# Patient Record
Sex: MALE | Race: WHITE | Employment: OTHER | URBAN - METROPOLITAN AREA
[De-identification: names, ages, dates, MRNs, and addresses within clinical notes are randomized per-mention and may not be internally consistent; named-entity substitution may affect disease eponyms.]

---

## 2017-05-09 ENCOUNTER — LAB ENCOUNTER (OUTPATIENT)
Dept: LAB | Facility: HOSPITAL | Age: 67
End: 2017-05-09
Attending: INTERNAL MEDICINE
Payer: COMMERCIAL

## 2017-05-09 ENCOUNTER — PRIOR ORIGINAL RECORDS (OUTPATIENT)
Dept: OTHER | Age: 67
End: 2017-05-09

## 2017-05-09 DIAGNOSIS — I10 HTN (HYPERTENSION): ICD-10-CM

## 2017-05-09 DIAGNOSIS — E78.2 MIXED HYPERLIPIDEMIA: Primary | ICD-10-CM

## 2017-05-09 DIAGNOSIS — Z12.5 SPECIAL SCREENING FOR MALIGNANT NEOPLASM OF PROSTATE: ICD-10-CM

## 2017-05-09 PROCEDURE — 85025 COMPLETE CBC W/AUTO DIFF WBC: CPT

## 2017-05-09 PROCEDURE — 80061 LIPID PANEL: CPT

## 2017-05-09 PROCEDURE — 81001 URINALYSIS AUTO W/SCOPE: CPT

## 2017-05-09 PROCEDURE — 84153 ASSAY OF PSA TOTAL: CPT

## 2017-05-09 PROCEDURE — 80053 COMPREHEN METABOLIC PANEL: CPT

## 2017-05-09 PROCEDURE — 36415 COLL VENOUS BLD VENIPUNCTURE: CPT

## 2017-12-19 ENCOUNTER — PRIOR ORIGINAL RECORDS (OUTPATIENT)
Dept: OTHER | Age: 67
End: 2017-12-19

## 2017-12-22 ENCOUNTER — PRIOR ORIGINAL RECORDS (OUTPATIENT)
Dept: OTHER | Age: 67
End: 2017-12-22

## 2017-12-22 ENCOUNTER — MYAURORA ACCOUNT LINK (OUTPATIENT)
Dept: OTHER | Age: 67
End: 2017-12-22

## 2018-01-29 ENCOUNTER — PRIOR ORIGINAL RECORDS (OUTPATIENT)
Dept: OTHER | Age: 68
End: 2018-01-29

## 2018-02-02 LAB
ALBUMIN: 4 G/DL
ALKALINE PHOSPHATATE(ALK PHOS): 46 IU/L
ALT (SGPT): 20 U/L
AST (SGOT): 19 U/L
BILIRUBIN TOTAL: 0.4 MG/DL
BUN: 10 MG/DL
CALCIUM: 8.8 MG/DL
CHLORIDE: 102 MEQ/L
CHOLESTEROL, TOTAL: 153 MG/DL
CREATININE, SERUM: 0.83 MG/DL
GLOBULIN: 3 G/DL
GLUCOSE: 107 MG/DL
GLUCOSE: 107 MG/DL
HDL CHOLESTEROL: 34 MG/DL
HEMATOCRIT: 43.9 %
HEMOGLOBIN: 14.5 G/DL
LDL CHOLESTEROL: 69 MG/DL
NON-HDL CHOLESTEROL: 119 MG/DL
PLATELETS: 131 K/UL
POTASSIUM, SERUM: 4 MEQ/L
PROTEIN, TOTAL: 7 G/DL
RED BLOOD COUNT: 5.36 X 10-6/U
SGOT (AST): 19 IU/L
SGPT (ALT): 20 IU/L
SODIUM: 137 MEQ/L
TRIGLYCERIDES: 250 MG/DL
WHITE BLOOD COUNT: 7.1 X 10-3/U

## 2018-02-05 ENCOUNTER — MYAURORA ACCOUNT LINK (OUTPATIENT)
Dept: OTHER | Age: 68
End: 2018-02-05

## 2018-02-05 ENCOUNTER — PRIOR ORIGINAL RECORDS (OUTPATIENT)
Dept: OTHER | Age: 68
End: 2018-02-05

## 2018-02-23 ENCOUNTER — APPOINTMENT (OUTPATIENT)
Dept: CV DIAGNOSTICS | Facility: HOSPITAL | Age: 68
DRG: 853 | End: 2018-02-23
Attending: INTERNAL MEDICINE
Payer: MEDICARE

## 2018-02-23 ENCOUNTER — APPOINTMENT (OUTPATIENT)
Dept: GENERAL RADIOLOGY | Facility: HOSPITAL | Age: 68
DRG: 853 | End: 2018-02-23
Attending: INTERNAL MEDICINE
Payer: MEDICARE

## 2018-02-23 ENCOUNTER — HOSPITAL ENCOUNTER (INPATIENT)
Facility: HOSPITAL | Age: 68
LOS: 7 days | Discharge: HOME | DRG: 853 | End: 2018-03-02
Attending: INTERNAL MEDICINE | Admitting: INTERNAL MEDICINE
Payer: MEDICARE

## 2018-02-23 DIAGNOSIS — A41.9 SEPSIS (HCC): ICD-10-CM

## 2018-02-23 LAB
ALBUMIN SERPL BCP-MCNC: 2.9 G/DL (ref 3.5–4.8)
ALBUMIN/GLOB SERPL: 0.9 {RATIO} (ref 1–2)
ALP SERPL-CCNC: 60 U/L (ref 32–100)
ALT SERPL-CCNC: 42 U/L (ref 17–63)
ANION GAP SERPL CALC-SCNC: 12 MMOL/L (ref 0–18)
AST SERPL-CCNC: 43 U/L (ref 15–41)
BACTERIA UR QL AUTO: NEGATIVE /HPF
BASOPHILS # BLD: 0.1 K/UL (ref 0–0.2)
BASOPHILS NFR BLD: 1 %
BILIRUB SERPL-MCNC: 0.9 MG/DL (ref 0.3–1.2)
BILIRUB UR QL: NEGATIVE
BUN SERPL-MCNC: 18 MG/DL (ref 8–20)
BUN/CREAT SERPL: 20.2 (ref 10–20)
CALCIUM SERPL-MCNC: 8 MG/DL (ref 8.5–10.5)
CHLORIDE SERPL-SCNC: 92 MMOL/L (ref 95–110)
CLARITY UR: CLEAR
CO2 SERPL-SCNC: 25 MMOL/L (ref 22–32)
COLOR UR: YELLOW
CREAT SERPL-MCNC: 0.89 MG/DL (ref 0.5–1.5)
EOSINOPHIL # BLD: 0 K/UL (ref 0–0.7)
EOSINOPHIL NFR BLD: 1 %
ERYTHROCYTE [DISTWIDTH] IN BLOOD BY AUTOMATED COUNT: 14.5 % (ref 11–15)
GLOBULIN PLAS-MCNC: 3.4 G/DL (ref 2.5–3.7)
GLUCOSE SERPL-MCNC: 127 MG/DL (ref 70–99)
GLUCOSE UR-MCNC: NEGATIVE MG/DL
HCT VFR BLD AUTO: 34.3 % (ref 41–52)
HGB BLD-MCNC: 11.3 G/DL (ref 13.5–17.5)
KETONES UR-MCNC: NEGATIVE MG/DL
LEUKOCYTE ESTERASE UR QL STRIP.AUTO: NEGATIVE
LYMPHOCYTES # BLD: 0.4 K/UL (ref 1–4)
LYMPHOCYTES NFR BLD: 4 %
MCH RBC QN AUTO: 26.4 PG (ref 27–32)
MCHC RBC AUTO-ENTMCNC: 32.9 G/DL (ref 32–37)
MCV RBC AUTO: 80.1 FL (ref 80–100)
MONOCYTES # BLD: 0.7 K/UL (ref 0–1)
MONOCYTES NFR BLD: 6 %
NEUTROPHILS # BLD AUTO: 9.2 K/UL (ref 1.8–7.7)
NEUTROPHILS NFR BLD: 88 %
NITRITE UR QL STRIP.AUTO: NEGATIVE
OSMOLALITY UR CALC.SUM OF ELEC: 271 MOSM/KG (ref 275–295)
PH UR: 6 [PH] (ref 5–8)
PLATELET # BLD AUTO: 96 K/UL (ref 140–400)
PMV BLD AUTO: 9.8 FL (ref 7.4–10.3)
POTASSIUM SERPL-SCNC: 3.9 MMOL/L (ref 3.3–5.1)
PROT SERPL-MCNC: 6.3 G/DL (ref 5.9–8.4)
PROT UR-MCNC: 30 MG/DL
RBC # BLD AUTO: 4.29 M/UL (ref 4.5–5.9)
RBC #/AREA URNS AUTO: 1 /HPF
SODIUM SERPL-SCNC: 129 MMOL/L (ref 136–144)
SP GR UR STRIP: 1.01 (ref 1–1.03)
UROBILINOGEN UR STRIP-ACNC: 4
VIT C UR-MCNC: NEGATIVE MG/DL
WBC # BLD AUTO: 10.4 K/UL (ref 4–11)
WBC #/AREA URNS AUTO: 2 /HPF

## 2018-02-23 PROCEDURE — 93005 ELECTROCARDIOGRAM TRACING: CPT

## 2018-02-23 PROCEDURE — 81001 URINALYSIS AUTO W/SCOPE: CPT | Performed by: INTERNAL MEDICINE

## 2018-02-23 PROCEDURE — A4216 STERILE WATER/SALINE, 10 ML: HCPCS

## 2018-02-23 PROCEDURE — 85025 COMPLETE CBC W/AUTO DIFF WBC: CPT | Performed by: INTERNAL MEDICINE

## 2018-02-23 PROCEDURE — 96365 THER/PROPH/DIAG IV INF INIT: CPT

## 2018-02-23 PROCEDURE — 87147 CULTURE TYPE IMMUNOLOGIC: CPT | Performed by: INTERNAL MEDICINE

## 2018-02-23 PROCEDURE — 87186 SC STD MICRODIL/AGAR DIL: CPT | Performed by: INTERNAL MEDICINE

## 2018-02-23 PROCEDURE — 87040 BLOOD CULTURE FOR BACTERIA: CPT | Performed by: INTERNAL MEDICINE

## 2018-02-23 PROCEDURE — 93306 TTE W/DOPPLER COMPLETE: CPT | Performed by: INTERNAL MEDICINE

## 2018-02-23 PROCEDURE — 93010 ELECTROCARDIOGRAM REPORT: CPT | Performed by: INTERNAL MEDICINE

## 2018-02-23 PROCEDURE — 73502 X-RAY EXAM HIP UNI 2-3 VIEWS: CPT | Performed by: INTERNAL MEDICINE

## 2018-02-23 PROCEDURE — 80053 COMPREHEN METABOLIC PANEL: CPT | Performed by: INTERNAL MEDICINE

## 2018-02-23 RX ORDER — 0.9 % SODIUM CHLORIDE 0.9 %
VIAL (ML) INJECTION
Status: DISPENSED
Start: 2018-02-23 | End: 2018-02-24

## 2018-02-23 RX ORDER — COVID-19 ANTIGEN TEST
220 KIT MISCELLANEOUS 2 TIMES DAILY PRN
Status: ON HOLD | COMMUNITY
End: 2018-03-21

## 2018-02-23 RX ORDER — CYCLOBENZAPRINE HCL 10 MG
10 TABLET ORAL NIGHTLY PRN
Status: DISCONTINUED | OUTPATIENT
Start: 2018-02-23 | End: 2018-03-02

## 2018-02-23 RX ORDER — ATORVASTATIN CALCIUM 20 MG/1
20 TABLET, FILM COATED ORAL NIGHTLY
Status: ON HOLD | COMMUNITY
End: 2018-03-21

## 2018-02-23 RX ORDER — ACETAMINOPHEN 325 MG/1
650 TABLET ORAL EVERY 6 HOURS PRN
Status: DISCONTINUED | OUTPATIENT
Start: 2018-02-23 | End: 2018-03-02

## 2018-02-23 RX ORDER — LISINOPRIL 20 MG/1
20 TABLET ORAL DAILY
Status: DISCONTINUED | OUTPATIENT
Start: 2018-02-23 | End: 2018-03-02

## 2018-02-23 RX ORDER — ATORVASTATIN CALCIUM 20 MG/1
20 TABLET, FILM COATED ORAL NIGHTLY
Status: DISCONTINUED | OUTPATIENT
Start: 2018-02-23 | End: 2018-03-02

## 2018-02-23 RX ORDER — HYDROCODONE BITARTRATE AND ACETAMINOPHEN 5; 325 MG/1; MG/1
1-2 TABLET ORAL
Status: DISCONTINUED | OUTPATIENT
Start: 2018-02-23 | End: 2018-02-27

## 2018-02-23 RX ORDER — ASPIRIN 81 MG/1
81 TABLET ORAL DAILY
Status: DISCONTINUED | OUTPATIENT
Start: 2018-02-23 | End: 2018-02-28

## 2018-02-23 RX ORDER — LISINOPRIL 20 MG/1
10 TABLET ORAL DAILY
COMMUNITY

## 2018-02-23 RX ORDER — CYCLOBENZAPRINE HCL 10 MG
10 TABLET ORAL NIGHTLY PRN
COMMUNITY

## 2018-02-23 RX ORDER — HYDROCODONE BITARTRATE AND ACETAMINOPHEN 5; 325 MG/1; MG/1
1-2 TABLET ORAL
COMMUNITY

## 2018-02-23 NOTE — CONSULTS
INFECTIOUS DISEASE CONSULT NOTE    Carlos Orlando Patient Status:  Inpatient    1950 MRN [id number]   Location [facility] 1W Attending Darrius Yoder, *   Hosp Day # 0 PCP per tab 1-2 tablet, 1-2 tablet, Oral, Daily PRN  •  lisinopril (PRINIVIL,ZESTRIL) tab 20 mg, 20 mg, Oral, Daily  •  Sodium Chloride 0.9 % solution, , ,   •  Vancomycin HCl (VANCOCIN) 2,000 mg in sodium chloride 0.9 % 500 mL IVPB, 25 mg/kg (Adjusted), Intra ALT, BILT, TP in the last 72 hours. Microbiology    Reviewed in EMR    Radiology: Reviewed      Antibiotics: IV vancomycin      ASSESSMENT:  77-year-old male with a history of HTN, HLD who presents to hospital for bacteremia as an outpatient.   Patient w

## 2018-02-23 NOTE — PROGRESS NOTES
02/23/18 1519   Clinical Encounter Type   Visited With Patient   Routine Visit Introduction  ([facility])   Continue Visiting Yes   Patient Spiritual Encounters   Spiritual Assessment Completed 2     Introduced pt to spiritual care and provi

## 2018-02-24 ENCOUNTER — APPOINTMENT (OUTPATIENT)
Dept: GENERAL RADIOLOGY | Facility: HOSPITAL | Age: 68
DRG: 853 | End: 2018-02-24
Attending: INTERNAL MEDICINE
Payer: MEDICARE

## 2018-02-24 PROCEDURE — 72072 X-RAY EXAM THORAC SPINE 3VWS: CPT | Performed by: INTERNAL MEDICINE

## 2018-02-24 PROCEDURE — 72100 X-RAY EXAM L-S SPINE 2/3 VWS: CPT | Performed by: INTERNAL MEDICINE

## 2018-02-24 PROCEDURE — A4216 STERILE WATER/SALINE, 10 ML: HCPCS

## 2018-02-24 RX ORDER — 0.9 % SODIUM CHLORIDE 0.9 %
VIAL (ML) INJECTION
Status: DISPENSED
Start: 2018-02-24 | End: 2018-02-25

## 2018-02-24 NOTE — PLAN OF CARE
Dr Orquidea Ceballos made aware of positive blood culture results. States to keep with current Vancomycin antibiotic. No new orders.

## 2018-02-24 NOTE — HISTORICAL OFFICE NOTE
Tadeo Holden  : 1950  ACCOUNT:  [id number]  [phone]  PCP: Dr. Telles Resides     TODAY'S DATE: 2018  DICTATED BY:  [Dr. Danitza Kenny]      CHIEF COMPLAINT: [Followup of Hypercholesteremia, pure and Followup of Hypertension.] and rhythm, clear to auscultation. GI: no masses, tenderness or hepatosplenomegaly, rectal deferred. MS: adequate gait for exercise/testing. EXT: no clubbing or cyanosis. SKIN: no rashes, lesions, ulcers.   NEURO/PSYCH: alert and oriented to time, place an

## 2018-02-24 NOTE — CONSULTS
[facility] AND [facility]  [facility] Cardiology Consult Note    Irena Morrissey Patient Status:  Inpatient    1950 MRN [id number]   [facility] 5SW/SE Attending Lesley Blanco, *   Hosp Day # 1 PCP Oumou Rodriguez MD     61 Alert and oriented x 3. No apparent distress. No respiratory or constitutional distress. HEENT: Normocephalic, anicteric sclera, neck supple. Neck: No JVD, carotids 2+, no bruits. Cardiac: Regular rate and rhythm.  S1, S2 normal. No murmur, pericardial r

## 2018-02-24 NOTE — PROGRESS NOTES
[facility] [place] [facility]    Progress Note    Aleida Adhikari Patient Status:  Inpatient    1950 MRN [id number]   [facility] 5SW/SE Attending Herminia Michael, [address] Day # 1 PCP Maria Isabel Cody MD     Subject (L) 02/23/2018   ALB 2.9 (L) 02/23/2018   ALKPHO 60 02/23/2018   BILT 0.9 02/23/2018   TP 6.3 02/23/2018   AST 43 (H) 02/23/2018   ALT 42 02/23/2018   PSA 1.4 05/09/2017       Xr Routine Thoracic Spine (3 Views) (cpt=72072)    Result Date: 2/24/2018  CONCL

## 2018-02-24 NOTE — PROGRESS NOTES
[facility] ID PROGRESS NOTE    [facility] Patient Status:  Inpatient    1950 MRN [id number]   Location [facility] 5SW/SE Attending Sky Yi Day # 1 PCP Maribel Murphy MD     Subjective:  Awake, feeling bet he scratches.     # Acute GPC in pairs bacteremia with rigors - r/o IE; ?  Skin source; r/o septic joint focus although not consistent on exam    -XR hip/pelvis with no changes, FU hip of thoracic and lumbar spine  # HTN  # HLD      PLAN:  -  Continue with

## 2018-02-24 NOTE — CM/SW NOTE
MDO received indicating pt needs information on Medicare B. SW obtained paperwork for the Medicare eligibility and the enrollment process. This was provided to the nurse to provide to family when they arrive.     ANA MARÍA quigley [id number]

## 2018-02-24 NOTE — H&P
[facility]    PATIENT'S NAME: Ole Lees   ATTENDING PHYSICIAN: Rd Hook MD   PATIENT ACCOUNT#:   [id number]    LOCATION:  [place] [address]. RECORD #:   [id number]       YOB: 1950  ADMISSION DATE: admitted for bacteremia. He has gram-positive cocci in pairs. We have ID on the case. We will put him on vancomycin, Pharmacy to dose. Will order x-ray of the pelvis and echocardiogram tomorrow.     Dictated By Timmy Alva MD  d: 02/23/2018 1

## 2018-02-24 NOTE — PLAN OF CARE
Problem: Patient/Family Goals  Goal: Patient/Family Long Term Goal  Patient's Long Term Goal: to go home  Interventions:  - See additional Care Plan goals for specific interventions    Outcome: Progressing    Goal: Patient/Family Short Term Goal  Patient's

## 2018-02-24 NOTE — PLAN OF CARE
Problem: Patient/Family Goals  Goal: Patient/Family Long Term Goal  Patient's Long Term Goal: to go home  Interventions:  - See additional Care Plan goals for specific interventions    Outcome: Not Progressing  Patient just admitted today  Goal: Patient/Fa

## 2018-02-25 PROBLEM — A41.89 SEPSIS, GRAM POSITIVE (HCC): Status: ACTIVE | Noted: 2018-02-25

## 2018-02-25 LAB — VANCOMYCIN TROUGH SERPL-MCNC: 7.6 MCG/ML (ref 10–20)

## 2018-02-25 PROCEDURE — 97162 PT EVAL MOD COMPLEX 30 MIN: CPT

## 2018-02-25 PROCEDURE — 97116 GAIT TRAINING THERAPY: CPT

## 2018-02-25 PROCEDURE — A4216 STERILE WATER/SALINE, 10 ML: HCPCS | Performed by: NURSE PRACTITIONER

## 2018-02-25 PROCEDURE — 97166 OT EVAL MOD COMPLEX 45 MIN: CPT

## 2018-02-25 PROCEDURE — 80202 ASSAY OF VANCOMYCIN: CPT | Performed by: INTERNAL MEDICINE

## 2018-02-25 PROCEDURE — 87040 BLOOD CULTURE FOR BACTERIA: CPT | Performed by: PHYSICIAN ASSISTANT

## 2018-02-25 RX ORDER — SODIUM CHLORIDE 0.9 % (FLUSH) 0.9 %
10 SYRINGE (ML) INJECTION AS NEEDED
Status: DISCONTINUED | OUTPATIENT
Start: 2018-02-25 | End: 2018-03-01

## 2018-02-25 RX ORDER — SODIUM CHLORIDE 9 MG/ML
INJECTION, SOLUTION INTRAVENOUS CONTINUOUS
Status: DISCONTINUED | OUTPATIENT
Start: 2018-02-25 | End: 2018-03-02

## 2018-02-25 NOTE — PROGRESS NOTES
[facility]    Progress Note    Nelda Bob Patient Status:  Inpatient    1950 MRN [id number]   [facility] 5SW/SE Attending Frankie Santos, [facility] Day # 2 PCP Nu Maharaj MD     Subject 02/23/2018   ALB 2.9 (L) 02/23/2018   ALKPHO 60 02/23/2018   BILT 0.9 02/23/2018   TP 6.3 02/23/2018   AST 43 (H) 02/23/2018   ALT 42 02/23/2018   PSA 1.4 05/09/2017       Xr Routine Thoracic Spine (3 Views) (cpt=72072)    Result Date: 2/24/2018  CONCLUSIO

## 2018-02-25 NOTE — OCCUPATIONAL THERAPY NOTE
OCCUPATIONAL THERAPY EVALUATION - INPATIENT     Room Number: 525/525-A  Evaluation Date: 2/25/2018  Type of Evaluation: Initial  Presenting Problem: sepsis    Physician Order: IP Consult to Occupational Therapy  Reason for Therapy: ADL/IADL Dysfunction and prior level of function. 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  He will benefit from con't acute OT for family training to train wife how to assist pt with safe bed mobility transf lower leg  Management Techniques: Relaxation;Repositioning    ACTIVITY TOLERANCE  O2 Saturation: 92%  Room air  Heart Rate: 96    COGNITION  Following Commands:  follows one step commands without difficulty        PERCEPTION  Overall Perception Status:   W sponge for LB bathing  Toileting: NT  Upper Extremity Dressing: independent  Lower Extremity Dressing: Max A to don socks. Pt will benefit from instruction for [facility] however wife is currently assisting him.     Education Provided: role of OT, functional platt

## 2018-02-25 NOTE — PROGRESS NOTES
[facility] ID PROGRESS NOTE    Elsa Birmingham Patient Status:  Inpatient    1950 MRN [id number]   Location [facility] 5SW/SE Attending Lucinda Mauro Day # 2 PCP Yaz Becerril MD     Subjective:  Awake, feeling bet does get superificial burns at work which he scratches.     # Acute Staph aureus bacteremia, sepsis - r/o IE; ?  Skin source; r/o septic joint focus although not consistent on exam    -XR hip/pelvis with no changes, FU hip of thoracic and lumbar spine   -no

## 2018-02-25 NOTE — PLAN OF CARE
Problem: Patient/Family Goals  Goal: Patient/Family Long Term Goal  Patient's Long Term Goal: to go home  Interventions: BLOOD CX  -2D ECHO  - See additional Care Plan goals for specific interventions    Outcome: Progressing    Goal: Patient/Family Short T

## 2018-02-25 NOTE — PHYSICAL THERAPY NOTE
PHYSICAL THERAPY EVALUATION - INPATIENT     Room Number: 525/525-A  Evaluation Date: 2/25/2018  Type of Evaluation: Initial  Physician Order: PT Eval and Treat    Presenting Problem: sepsis, abnormal EKG, pain  Reason for Therapy: Mobility Dysfunctio bpm    AM-PAC '6-Clicks' INPATIENT SHORT FORM - BASIC MOBILITY  How much difficulty does the patient currently have. ..  -   Turning over in bed (including adjusting bedclothes, sheets and blankets)?: None   -   Sitting down on and standing up from a chair the evaluation complexity is considered moderate. These impairments and comorbidities manifest themselves as functional limitations in independent bed mobility, transfers, and gait.   Currently he required SBA with bed mobility with VCs for proper techniqu

## 2018-02-25 NOTE — PROGRESS NOTES
[facility]    Progress Note    Romeo Bridegroom Patient Status:  Inpatient    1950 MRN [id number]   [facility] 5SW/SE Attending Jyothi Chowdhury, [address] Day # 2 PCP Inge Ibanez MD       Assessm 1440   RBC  4.29*   HGB  11.3*   HCT  34.3*   MCV  80.1   MCH  26.4*   MCHC  32.9   RDW  14.5   WBC  10.4   PLT  96*       Recent Labs   Lab  02/23/18   1440   GLU  127*   BUN  18   CREATSERUM  0.89   GFRAA  >60   GFRNAA  >60   CA  8.0*   NA  129*   K  3.9

## 2018-02-26 ENCOUNTER — APPOINTMENT (OUTPATIENT)
Dept: CV DIAGNOSTICS | Facility: HOSPITAL | Age: 68
DRG: 853 | End: 2018-02-26
Attending: NURSE PRACTITIONER
Payer: MEDICARE

## 2018-02-26 ENCOUNTER — APPOINTMENT (OUTPATIENT)
Dept: INTERVENTIONAL RADIOLOGY/VASCULAR | Facility: HOSPITAL | Age: 68
DRG: 853 | End: 2018-02-26
Attending: NURSE PRACTITIONER
Payer: MEDICARE

## 2018-02-26 LAB — VANCOMYCIN TROUGH SERPL-MCNC: 10.4 MCG/ML (ref 10–20)

## 2018-02-26 PROCEDURE — B246ZZ4 ULTRASONOGRAPHY OF RIGHT AND LEFT HEART, TRANSESOPHAGEAL: ICD-10-PCS | Performed by: INTERNAL MEDICINE

## 2018-02-26 PROCEDURE — 99153 MOD SED SAME PHYS/QHP EA: CPT

## 2018-02-26 PROCEDURE — 93325 DOPPLER ECHO COLOR FLOW MAPG: CPT | Performed by: NURSE PRACTITIONER

## 2018-02-26 PROCEDURE — 93320 DOPPLER ECHO COMPLETE: CPT | Performed by: NURSE PRACTITIONER

## 2018-02-26 PROCEDURE — 80202 ASSAY OF VANCOMYCIN: CPT | Performed by: INTERNAL MEDICINE

## 2018-02-26 PROCEDURE — A4216 STERILE WATER/SALINE, 10 ML: HCPCS | Performed by: NURSE PRACTITIONER

## 2018-02-26 PROCEDURE — 99152 MOD SED SAME PHYS/QHP 5/>YRS: CPT

## 2018-02-26 PROCEDURE — 93312 ECHO TRANSESOPHAGEAL: CPT

## 2018-02-26 PROCEDURE — 97530 THERAPEUTIC ACTIVITIES: CPT

## 2018-02-26 RX ORDER — MIDAZOLAM HYDROCHLORIDE 1 MG/ML
INJECTION INTRAMUSCULAR; INTRAVENOUS
Status: COMPLETED
Start: 2018-02-26 | End: 2018-02-26

## 2018-02-26 RX ORDER — MIDAZOLAM HYDROCHLORIDE 1 MG/ML
2 INJECTION INTRAMUSCULAR; INTRAVENOUS EVERY 5 MIN PRN
Status: DISCONTINUED | OUTPATIENT
Start: 2018-02-26 | End: 2018-03-02

## 2018-02-26 RX ORDER — CEFAZOLIN SODIUM/WATER 2 G/20 ML
2 SYRINGE (ML) INTRAVENOUS EVERY 8 HOURS
Status: DISCONTINUED | OUTPATIENT
Start: 2018-02-26 | End: 2018-03-02

## 2018-02-26 RX ORDER — SODIUM CHLORIDE 9 MG/ML
INJECTION, SOLUTION INTRAVENOUS
Status: COMPLETED
Start: 2018-02-26 | End: 2018-02-26

## 2018-02-26 RX ORDER — ONDANSETRON 2 MG/ML
4 INJECTION INTRAMUSCULAR; INTRAVENOUS EVERY 6 HOURS PRN
Status: DISCONTINUED | OUTPATIENT
Start: 2018-02-26 | End: 2018-03-02

## 2018-02-26 NOTE — PROGRESS NOTES
[facility] dosing service    Follow-up Pharmacokinetic Consult for Vancomycin Dosing     Henry Soriano is a 69year old male who is being treated for S. Aureus bacteremia.    Patient is on day 3 of Vancomycin currently receiving 1250mg every 12 desean

## 2018-02-26 NOTE — PROGRESS NOTES
[facility]    Progress Note    Roberto Carlos [place] Patient Status:  Inpatient    1950 MRN [id number]   [facility] 5SW/SE Attending Zack Powell, *   Hosp Day # 3 PCP Oumou Florence MD     Subject

## 2018-02-26 NOTE — BRIEF PROCEDURE NOTE
[facility] CARDIOLOGY  WALDEMAR Note    Abhi Dhaliwal Location:      N [id number]   Admission Date 2/23/2018 Operation Date 2/26/2018   Attending Physician Irma Foster, * Operating Physician Bettina Castellano MD     Pre-Operative Diagnosis: Diane Weston

## 2018-02-26 NOTE — PLAN OF CARE
Problem: Patient/Family Goals  Goal: Patient/Family Long Term Goal  Patient's Long Term Goal: to go home  Interventions: BLOOD CX  -2D ECHO  - See additional Care Plan goals for specific interventions     Outcome: Progressing  Pt had WALDEMAR, no endocarditis f

## 2018-02-26 NOTE — PLAN OF CARE
Problem: Patient/Family Goals  Goal: Patient/Family Long Term Goal  Patient's Long Term Goal: to go home  Interventions: BLOOD CX  -2D ECHO  - See additional Care Plan goals for specific interventions     Outcome: Progressing    Goal: Patient/Family Short

## 2018-02-26 NOTE — OCCUPATIONAL THERAPY NOTE
OCCUPATIONAL THERAPY TREATMENT NOTE - INPATIENT        Room Number: 525/525-A           Presenting Problem: sepsis    Problem List  Active Problems:    Sepsis, Gram positive ([facility] [address]      ASSESSMENT   Coordinated care with RN who provided consent to proceed w it bad to stay here? \" re: bed    OBJECTIVE  Precautions: Limb alert - left    WEIGHT BEARING RESTRICTION  Weight Bearing Restriction: None                PAIN ASSESSMENT  Ratin  Location: lower back; LE  Management Techniques: Activity promotion; Body adls with supervision    Comment: CGA ~8 minutes with functional moblity    Patient will complete toileting with supervision  Comment:NT          Goals  on: 3/11/18  Frequency: 3-5x/week      Alva Billings OTR/L   [address] [place] [address]

## 2018-02-26 NOTE — PROGRESS NOTES
Mary Kay Rm  [id number]      Post procedure/ recovery hand-off report given to [facility]. Patient's vital signs stable. Pt able to swallow with out difficulty.   Tania Diggs RN

## 2018-02-26 NOTE — PROGRESS NOTES
[facility]    Progress Note    Jaqui Drafts Patient Status:  Inpatient    1950 MRN [id number]   [facility] 5SW/SE Attending Mau Guevara, *   Hosp Day # 3 PCP Adriana Marie MD     Subject 02/23/2018   CL 92 (L) 02/23/2018   CO2 25 02/23/2018    (H) 02/23/2018   CA 8.0 (L) 02/23/2018   ALB 2.9 (L) 02/23/2018   ALKPHO 60 02/23/2018   BILT 0.9 02/23/2018   TP 6.3 02/23/2018   AST 43 (H) 02/23/2018   ALT 42 02/23/2018   PSA 1.4 05/09/201

## 2018-02-26 NOTE — CM/SW NOTE
Met with patient at bedside to explain the BPCI/Medicare program. Patient agreed with phone follow up for 3 months from [address] [place] [address] after discharge from [address] [place] [address]. Patient was enrolled under DRG   872   . BPCI/Medicare letter and brochure provided.

## 2018-02-27 ENCOUNTER — APPOINTMENT (OUTPATIENT)
Dept: MRI IMAGING | Facility: HOSPITAL | Age: 68
DRG: 853 | End: 2018-02-27
Attending: INTERNAL MEDICINE
Payer: MEDICARE

## 2018-02-27 ENCOUNTER — ANESTHESIA (OUTPATIENT)
Dept: SURGERY | Facility: HOSPITAL | Age: 68
DRG: 853 | End: 2018-02-27
Payer: MEDICARE

## 2018-02-27 ENCOUNTER — ANESTHESIA EVENT (OUTPATIENT)
Dept: SURGERY | Facility: HOSPITAL | Age: 68
DRG: 853 | End: 2018-02-27
Payer: MEDICARE

## 2018-02-27 ENCOUNTER — APPOINTMENT (OUTPATIENT)
Dept: GENERAL RADIOLOGY | Facility: HOSPITAL | Age: 68
DRG: 853 | End: 2018-02-27
Attending: NEUROLOGICAL SURGERY
Payer: MEDICARE

## 2018-02-27 LAB
ANTIBODY SCREEN: NEGATIVE
RH BLOOD TYPE: POSITIVE

## 2018-02-27 PROCEDURE — 86901 BLOOD TYPING SEROLOGIC RH(D): CPT | Performed by: NEUROLOGICAL SURGERY

## 2018-02-27 PROCEDURE — 87205 SMEAR GRAM STAIN: CPT | Performed by: NEUROLOGICAL SURGERY

## 2018-02-27 PROCEDURE — 86850 RBC ANTIBODY SCREEN: CPT | Performed by: NEUROLOGICAL SURGERY

## 2018-02-27 PROCEDURE — 76001 XR C-ARM FLUORO >1 HOUR  (CPT=76001): CPT | Performed by: NEUROLOGICAL SURGERY

## 2018-02-27 PROCEDURE — 87176 TISSUE HOMOGENIZATION CULTR: CPT | Performed by: NEUROLOGICAL SURGERY

## 2018-02-27 PROCEDURE — 87147 CULTURE TYPE IMMUNOLOGIC: CPT | Performed by: NEUROLOGICAL SURGERY

## 2018-02-27 PROCEDURE — 87206 SMEAR FLUORESCENT/ACID STAI: CPT | Performed by: NEUROLOGICAL SURGERY

## 2018-02-27 PROCEDURE — 87075 CULTR BACTERIA EXCEPT BLOOD: CPT | Performed by: NEUROLOGICAL SURGERY

## 2018-02-27 PROCEDURE — 87070 CULTURE OTHR SPECIMN AEROBIC: CPT | Performed by: NEUROLOGICAL SURGERY

## 2018-02-27 PROCEDURE — A4216 STERILE WATER/SALINE, 10 ML: HCPCS | Performed by: NURSE PRACTITIONER

## 2018-02-27 PROCEDURE — 87186 SC STD MICRODIL/AGAR DIL: CPT | Performed by: NEUROLOGICAL SURGERY

## 2018-02-27 PROCEDURE — 87116 MYCOBACTERIA CULTURE: CPT | Performed by: NEUROLOGICAL SURGERY

## 2018-02-27 PROCEDURE — 009U0ZX DRAINAGE OF SPINAL CANAL, OPEN APPROACH, DIAGNOSTIC: ICD-10-PCS | Performed by: NEUROLOGICAL SURGERY

## 2018-02-27 PROCEDURE — 72195 MRI PELVIS W/O DYE: CPT | Performed by: INTERNAL MEDICINE

## 2018-02-27 PROCEDURE — 30233R1 TRANSFUSION OF NONAUTOLOGOUS PLATELETS INTO PERIPHERAL VEIN, PERCUTANEOUS APPROACH: ICD-10-PCS | Performed by: INTERNAL MEDICINE

## 2018-02-27 PROCEDURE — 72158 MRI LUMBAR SPINE W/O & W/DYE: CPT | Performed by: INTERNAL MEDICINE

## 2018-02-27 PROCEDURE — 72157 MRI CHEST SPINE W/O & W/DYE: CPT | Performed by: INTERNAL MEDICINE

## 2018-02-27 PROCEDURE — 00NY0ZZ RELEASE LUMBAR SPINAL CORD, OPEN APPROACH: ICD-10-PCS | Performed by: NEUROLOGICAL SURGERY

## 2018-02-27 PROCEDURE — 86900 BLOOD TYPING SEROLOGIC ABO: CPT | Performed by: NEUROLOGICAL SURGERY

## 2018-02-27 PROCEDURE — 36430 TRANSFUSION BLD/BLD COMPNT: CPT

## 2018-02-27 PROCEDURE — 87102 FUNGUS ISOLATION CULTURE: CPT | Performed by: NEUROLOGICAL SURGERY

## 2018-02-27 RX ORDER — METHOCARBAMOL 750 MG/1
750 TABLET, FILM COATED ORAL 3 TIMES DAILY
Status: DISCONTINUED | OUTPATIENT
Start: 2018-02-27 | End: 2018-03-02

## 2018-02-27 RX ORDER — MORPHINE SULFATE 2 MG/ML
2 INJECTION, SOLUTION INTRAMUSCULAR; INTRAVENOUS EVERY 4 HOURS PRN
Status: DISCONTINUED | OUTPATIENT
Start: 2018-02-27 | End: 2018-03-02

## 2018-02-27 RX ORDER — SODIUM CHLORIDE, SODIUM LACTATE, POTASSIUM CHLORIDE, CALCIUM CHLORIDE 600; 310; 30; 20 MG/100ML; MG/100ML; MG/100ML; MG/100ML
INJECTION, SOLUTION INTRAVENOUS CONTINUOUS
Status: DISCONTINUED | OUTPATIENT
Start: 2018-02-27 | End: 2018-02-27 | Stop reason: HOSPADM

## 2018-02-27 RX ORDER — MORPHINE SULFATE 10 MG/ML
6 INJECTION, SOLUTION INTRAMUSCULAR; INTRAVENOUS EVERY 10 MIN PRN
Status: DISCONTINUED | OUTPATIENT
Start: 2018-02-27 | End: 2018-02-27 | Stop reason: HOSPADM

## 2018-02-27 RX ORDER — ONDANSETRON 2 MG/ML
4 INJECTION INTRAMUSCULAR; INTRAVENOUS ONCE AS NEEDED
Status: DISCONTINUED | OUTPATIENT
Start: 2018-02-27 | End: 2018-02-27 | Stop reason: HOSPADM

## 2018-02-27 RX ORDER — EPHEDRINE SULFATE 50 MG/ML
INJECTION, SOLUTION INTRAVENOUS AS NEEDED
Status: DISCONTINUED | OUTPATIENT
Start: 2018-02-27 | End: 2018-02-27 | Stop reason: SURG

## 2018-02-27 RX ORDER — SODIUM CHLORIDE 0.9 % (FLUSH) 0.9 %
10 SYRINGE (ML) INJECTION AS NEEDED
Status: DISCONTINUED | OUTPATIENT
Start: 2018-02-27 | End: 2018-03-01

## 2018-02-27 RX ORDER — DEXAMETHASONE SODIUM PHOSPHATE 4 MG/ML
VIAL (ML) INJECTION AS NEEDED
Status: DISCONTINUED | OUTPATIENT
Start: 2018-02-27 | End: 2018-02-27 | Stop reason: SURG

## 2018-02-27 RX ORDER — BUPIVACAINE HYDROCHLORIDE AND EPINEPHRINE 5; 5 MG/ML; UG/ML
INJECTION, SOLUTION PERINEURAL AS NEEDED
Status: DISCONTINUED | OUTPATIENT
Start: 2018-02-27 | End: 2018-02-27 | Stop reason: HOSPADM

## 2018-02-27 RX ORDER — SODIUM CHLORIDE 9 MG/ML
INJECTION, SOLUTION INTRAVENOUS ONCE
Status: DISCONTINUED | OUTPATIENT
Start: 2018-02-27 | End: 2018-03-02

## 2018-02-27 RX ORDER — NEOSTIGMINE METHYLSULFATE 0.5 MG/ML
INJECTION INTRAVENOUS AS NEEDED
Status: DISCONTINUED | OUTPATIENT
Start: 2018-02-27 | End: 2018-02-27 | Stop reason: SURG

## 2018-02-27 RX ORDER — ROCURONIUM BROMIDE 10 MG/ML
INJECTION, SOLUTION INTRAVENOUS AS NEEDED
Status: DISCONTINUED | OUTPATIENT
Start: 2018-02-27 | End: 2018-02-27 | Stop reason: SURG

## 2018-02-27 RX ORDER — LORAZEPAM 2 MG/ML
0.5 INJECTION INTRAMUSCULAR ONCE
Status: COMPLETED | OUTPATIENT
Start: 2018-02-27 | End: 2018-02-27

## 2018-02-27 RX ORDER — NALOXONE HYDROCHLORIDE 0.4 MG/ML
80 INJECTION, SOLUTION INTRAMUSCULAR; INTRAVENOUS; SUBCUTANEOUS AS NEEDED
Status: DISCONTINUED | OUTPATIENT
Start: 2018-02-27 | End: 2018-02-27 | Stop reason: HOSPADM

## 2018-02-27 RX ORDER — SODIUM CHLORIDE 9 MG/ML
INJECTION, SOLUTION INTRAVENOUS
Status: DISPENSED
Start: 2018-02-27 | End: 2018-02-28

## 2018-02-27 RX ORDER — MORPHINE SULFATE 2 MG/ML
2 INJECTION, SOLUTION INTRAMUSCULAR; INTRAVENOUS EVERY 10 MIN PRN
Status: DISCONTINUED | OUTPATIENT
Start: 2018-02-27 | End: 2018-02-27 | Stop reason: HOSPADM

## 2018-02-27 RX ORDER — MIDAZOLAM HYDROCHLORIDE 1 MG/ML
INJECTION INTRAMUSCULAR; INTRAVENOUS AS NEEDED
Status: DISCONTINUED | OUTPATIENT
Start: 2018-02-27 | End: 2018-02-27 | Stop reason: SURG

## 2018-02-27 RX ORDER — MORPHINE SULFATE 4 MG/ML
4 INJECTION, SOLUTION INTRAMUSCULAR; INTRAVENOUS EVERY 10 MIN PRN
Status: DISCONTINUED | OUTPATIENT
Start: 2018-02-27 | End: 2018-02-27 | Stop reason: HOSPADM

## 2018-02-27 RX ORDER — HYDROCODONE BITARTRATE AND ACETAMINOPHEN 5; 325 MG/1; MG/1
2 TABLET ORAL AS NEEDED
Status: DISCONTINUED | OUTPATIENT
Start: 2018-02-27 | End: 2018-02-27 | Stop reason: HOSPADM

## 2018-02-27 RX ORDER — SODIUM CHLORIDE 9 MG/ML
INJECTION, SOLUTION INTRAVENOUS ONCE
Status: COMPLETED | OUTPATIENT
Start: 2018-02-27 | End: 2018-02-27

## 2018-02-27 RX ORDER — HYDROCODONE BITARTRATE AND ACETAMINOPHEN 5; 325 MG/1; MG/1
1 TABLET ORAL AS NEEDED
Status: DISCONTINUED | OUTPATIENT
Start: 2018-02-27 | End: 2018-02-27 | Stop reason: HOSPADM

## 2018-02-27 RX ORDER — HYDROCODONE BITARTRATE AND ACETAMINOPHEN 10; 325 MG/1; MG/1
2 TABLET ORAL EVERY 6 HOURS PRN
Status: DISCONTINUED | OUTPATIENT
Start: 2018-02-27 | End: 2018-03-02

## 2018-02-27 RX ORDER — GLYCOPYRROLATE 0.2 MG/ML
INJECTION INTRAMUSCULAR; INTRAVENOUS AS NEEDED
Status: DISCONTINUED | OUTPATIENT
Start: 2018-02-27 | End: 2018-02-27 | Stop reason: SURG

## 2018-02-27 RX ADMIN — SODIUM CHLORIDE: 9 INJECTION, SOLUTION INTRAVENOUS at 17:50:00

## 2018-02-27 RX ADMIN — EPHEDRINE SULFATE 10 MG: 50 INJECTION, SOLUTION INTRAVENOUS at 17:20:00

## 2018-02-27 RX ADMIN — ROCURONIUM BROMIDE 50 MG: 10 INJECTION, SOLUTION INTRAVENOUS at 17:20:00

## 2018-02-27 RX ADMIN — MIDAZOLAM HYDROCHLORIDE 2 MG: 1 INJECTION INTRAMUSCULAR; INTRAVENOUS at 16:58:00

## 2018-02-27 RX ADMIN — DEXAMETHASONE SODIUM PHOSPHATE 4 MG: 4 MG/ML VIAL (ML) INJECTION at 17:11:00

## 2018-02-27 RX ADMIN — ONDANSETRON 4 MG: 2 INJECTION INTRAMUSCULAR; INTRAVENOUS at 17:14:00

## 2018-02-27 RX ADMIN — GLYCOPYRROLATE 0.6 MG: 0.2 INJECTION INTRAMUSCULAR; INTRAVENOUS at 19:10:00

## 2018-02-27 RX ADMIN — EPHEDRINE SULFATE 5 MG: 50 INJECTION, SOLUTION INTRAVENOUS at 17:46:00

## 2018-02-27 RX ADMIN — NEOSTIGMINE METHYLSULFATE 5 MG: 0.5 INJECTION INTRAVENOUS at 19:10:00

## 2018-02-27 RX ADMIN — CEFAZOLIN SODIUM/WATER 2 G: 2 G/20 ML SYRINGE (ML) INTRAVENOUS at 16:58:00

## 2018-02-27 RX ADMIN — SODIUM CHLORIDE, SODIUM LACTATE, POTASSIUM CHLORIDE, CALCIUM CHLORIDE: 600; 310; 30; 20 INJECTION, SOLUTION INTRAVENOUS at 16:58:00

## 2018-02-27 NOTE — OR PREOP
Pt received two units of platelets. Pt tolerated transfusion well. RN applied 2 L NC due to pt sats 88-89% on RA. Pt now sats 94% on 2 liters via NC. Dr. Jhoan Christensen aware.

## 2018-02-27 NOTE — PROGRESS NOTES
[facility] ID PROGRESS NOTE    Irais Samuels Patient Status:  Inpatient    1950 MRN [id number]   Location [facility] 5SW/SE Attending Chan Curry, *   Hosp Day # 3 PCP Omi Esteves MD     Subjective:  WALDEMAR today. Nausea. Acute MSSA bacteremia, sepsis - r/o IE; ?  Skin source; r/o septic joint focus although not consistent on exam    -XR hip/pelvis with no changes, FU hip of thoracic and lumbar spine   -no vegetations seen on TTE or WALDEMAR  # HTN  # HLD      PLAN:  -  D/c IV va

## 2018-02-27 NOTE — ANESTHESIA PREPROCEDURE EVALUATION
Anesthesia PreOp Note    HPI:     Chirag Brownlee is a 69year old male who presents for preoperative consultation requested by: Keaton Almodovar MD    Date of Surgery: 2/23/2018 - 2/27/2018    Procedure(s):  LUMBAR LAMINECTOMY 1 LEVEL  Indication: Sepsis Intravenous Q6H PRN Tyrell Gabriel MD 4 mg at 02/27/18 0245    [facility] Hold] ceFAZolin sodium (ANCEF/KEFZOL) 2 GM/20ML premix IV syringe 2 g 2 g Intravenous Q8H Jama Schafer MD Last Rate: 240 mL/hr at 02/27/18 1053 2 g at 02/27/18 1053   [MAR MCHC 32.9 02/23/2018   RDW 14.5 02/23/2018   PLT 96 (L) 02/23/2018   MPV 9.8 02/23/2018       Lab Results  Component Value Date    (L) 02/23/2018   K 3.9 02/23/2018   CL 92 (L) 02/23/2018   CO2 25 02/23/2018   BUN 18 02/23/2018   CREATSERUM 0.89 02 anesthetic management. All of the patient's questions were answered to the best of my ability. The patient desires the anesthetic management as planned.   Pastor De Leon  2/27/2018 4:13 PM

## 2018-02-27 NOTE — PHYSICAL THERAPY NOTE
PT will be help today with plan for sx intervention: noted to have per MRI 2/27/2018 as below. Will follow s/p procedure. Nursing is aware    Mri Spine Lumbar (w+wo) (cpt=72158)     Result Date: 2/27/2018  CONCLUSION:  1.  Significantly motion compromised e

## 2018-02-27 NOTE — PROGRESS NOTES
[facility] ID PROGRESS NOTE    Bridgette Reeder Patient Status:  Inpatient    1950 MRN [id number]   Location [facility] 5SW/SE Attending Evelyn Oleary, *   Hosp Day # 4 PCP Danyelle Neves MD     Subjective:  Patient back from an outpatient.  Of note patient does get superificial burns at work which he scratches.     # L3/4 epidural abscess with discitis/osteomyelitis  # Psoas abscess  # Acute MSSA septicemia secondary to above   -XR hip/pelvis with no changes, FU hip of thoracic

## 2018-02-27 NOTE — PLAN OF CARE
NEUROLOGICAL SURGERY & SPINE SURGERY      2/27/2018  1:53 PM     PRE-OPERATIVE CONSULTATION    Jaqui Narayanan was again informed of the nature of the problem, planned treatment, indications and alternatives.   We again reviewed the expected

## 2018-02-27 NOTE — PROGRESS NOTES
[facility]    Progress Note    Irenamatty Morrissey Patient Status:  Inpatient    1950 MRN [id number]   Location [facility] Attending Lesley Blanco, *   Hosp Day # 4 PCP Oumou Rodriguez MD CO2 25 02/23/2018    (H) 02/23/2018   CA 8.0 (L) 02/23/2018   ALB 2.9 (L) 02/23/2018   ALKPHO 60 02/23/2018   BILT 0.9 02/23/2018   TP 6.3 02/23/2018   AST 43 (H) 02/23/2018   ALT 42 02/23/2018   PSA 1.4 05/09/2017       Mri Spine Thoracic (w+wo) approximately 5.4 x 0.8 x 1.3 cm. There is marked resultant effacement of the thecal sac with severe spinal canal stenosis. Marked additional asymmetric edema and enlargement of the left psoas muscle, which is presumably related to extension of infection. intramuscular abscess. This finding is not completely included in the field of imaging. The visualized portion of this finding measures at least 39 x 46 x 109 mm (AP x transverse x CC).   The sacroiliac joints and pubic symphysis are normal.  There is no

## 2018-02-27 NOTE — CONSULTS
[facility]    Neurosurgery Consultation    Adonis Wright Patient Status:  Inpatient    1950 MRN [id number]   Location [facility] 5SW/SE Attending Lobito Murcia, *   Hosp Day # 4 PCP Edin Mendoza MD Daily PRN  •  lisinopril (PRINIVIL,ZESTRIL) tab 20 mg, 20 mg, Oral, Daily    Review of Systems:  A 10-point system was reviewed. Pertinent positives and negatives are noted in HPI.       Physical Exam:  Temp:  [98.2 °F (36.8 °C)-98.4 °F (36.9 °C)] 98.3 °F spine degenerative changes, most pronounced at T8-T9, with a left paracentral disc protrusion/extrusion at this level, which results in effacement of the ventral thecal sac. 4. Minimal dilation of the central spinal canal versus trace syrinx at T8-T9.  No e characterized on this nondedicated exam. 8. Lesser incidental findings as above.          Xr Lumbar Spine (min 2 Views) (cpt=72100)    Result Date: 2/24/2018  CONCLUSION:  1. Multilevel lumbar spine degenerative changes, most pronounced at L5-S1. 2. Atheros perirectal region, without obvious organized measurable fluid collection. Please refer to discussion above regarding a fluid collection/abscess in the left psoas muscle. Moderate-sized fat containing right inguinal hernia.   8 x 9 mm benign-appearing post

## 2018-02-28 LAB
ANION GAP SERPL CALC-SCNC: 6 MMOL/L (ref 0–18)
BASOPHILS # BLD: 0.1 K/UL (ref 0–0.2)
BASOPHILS NFR BLD: 1 %
BLOOD TYPE BARCODE: 7300
BUN SERPL-MCNC: 20 MG/DL (ref 8–20)
BUN/CREAT SERPL: 28.6 (ref 10–20)
CALCIUM SERPL-MCNC: 7.4 MG/DL (ref 8.5–10.5)
CHLORIDE SERPL-SCNC: 103 MMOL/L (ref 95–110)
CO2 SERPL-SCNC: 28 MMOL/L (ref 22–32)
CREAT SERPL-MCNC: 0.7 MG/DL (ref 0.5–1.5)
EOSINOPHIL # BLD: 0.3 K/UL (ref 0–0.7)
EOSINOPHIL NFR BLD: 2 %
ERYTHROCYTE [DISTWIDTH] IN BLOOD BY AUTOMATED COUNT: 14.8 % (ref 11–15)
GLUCOSE BLDC GLUCOMTR-MCNC: 111 MG/DL (ref 70–99)
GLUCOSE SERPL-MCNC: 128 MG/DL (ref 70–99)
HCT VFR BLD AUTO: 30.7 % (ref 41–52)
HGB BLD-MCNC: 10.1 G/DL (ref 13.5–17.5)
LYMPHOCYTES # BLD: 1.5 K/UL (ref 1–4)
LYMPHOCYTES NFR BLD: 12 %
MCH RBC QN AUTO: 26.6 PG (ref 27–32)
MCHC RBC AUTO-ENTMCNC: 32.9 G/DL (ref 32–37)
MCV RBC AUTO: 80.7 FL (ref 80–100)
MONOCYTES # BLD: 0.9 K/UL (ref 0–1)
MONOCYTES NFR BLD: 7 %
NEUTROPHILS # BLD AUTO: 10.3 K/UL (ref 1.8–7.7)
NEUTROPHILS NFR BLD: 79 %
OSMOLALITY UR CALC.SUM OF ELEC: 288 MOSM/KG (ref 275–295)
PLATELET # BLD AUTO: 248 K/UL (ref 140–400)
PMV BLD AUTO: 7.7 FL (ref 7.4–10.3)
POTASSIUM SERPL-SCNC: 3.6 MMOL/L (ref 3.3–5.1)
RBC # BLD AUTO: 3.8 M/UL (ref 4.5–5.9)
SODIUM SERPL-SCNC: 137 MMOL/L (ref 136–144)
WBC # BLD AUTO: 13.2 K/UL (ref 4–11)

## 2018-02-28 PROCEDURE — A9575 INJ GADOTERATE MEGLUMI 0.1ML: HCPCS | Performed by: INTERNAL MEDICINE

## 2018-02-28 PROCEDURE — 97164 PT RE-EVAL EST PLAN CARE: CPT

## 2018-02-28 PROCEDURE — 97168 OT RE-EVAL EST PLAN CARE: CPT

## 2018-02-28 PROCEDURE — 82962 GLUCOSE BLOOD TEST: CPT

## 2018-02-28 PROCEDURE — 85025 COMPLETE CBC W/AUTO DIFF WBC: CPT | Performed by: INTERNAL MEDICINE

## 2018-02-28 PROCEDURE — 80048 BASIC METABOLIC PNL TOTAL CA: CPT | Performed by: INTERNAL MEDICINE

## 2018-02-28 PROCEDURE — 97530 THERAPEUTIC ACTIVITIES: CPT

## 2018-02-28 PROCEDURE — 97116 GAIT TRAINING THERAPY: CPT

## 2018-02-28 RX ORDER — POTASSIUM CHLORIDE 20 MEQ/1
40 TABLET, EXTENDED RELEASE ORAL EVERY 4 HOURS
Status: COMPLETED | OUTPATIENT
Start: 2018-02-28 | End: 2018-02-28

## 2018-02-28 NOTE — PROGRESS NOTES
[facility] AND [facility]  [facility] Cardiology Progress Note    Kayla Miller Patient Status:  Inpatient    1950 MRN [id number]   Location [facility] 4W/SW/SE Attending Jessica Baron, *   Hosp Day # 5 PCP Lakeisha Luevano MD Normal excursions and effort. Abdomen: Soft, non-tender. BS-present. Extremities: Without clubbing, cyanosis or edema. Peripheral pulses are 2+. Neurologic: Alert and oriented, normal affect. Skin: Warm and dry.        Nicholas Palacio MD  2/28/2018  1:40 P

## 2018-02-28 NOTE — PROGRESS NOTES
[facility] ID PROGRESS NOTE    Nelda Bob Patient Status:  Inpatient    1950 MRN [id number]   Location [facility] 5SW/SE Attending Frankie Santos, *   Hosp Day # 5 PCP Nu Maharaj MD     Subjective:  Patient sitting at but denies injury from fish bones at work. No recent hospitalizations. No recent antibiotics as an outpatient.  Of note patient does get superificial burns at work which he scratches.     # L3/4 epidural abscess with discitis/osteomyelitis s/p L3-L4 eduardo

## 2018-02-28 NOTE — CM/SW NOTE
CTL note: Pt has Medicare A only - not BPCI eligible. Remedy Flag deactivated. Note placed in Episode Connect.

## 2018-02-28 NOTE — PHYSICAL THERAPY NOTE
PHYSICAL THERAPY REEVALUATION - INPATIENT     Room Number: 423/423-A  Evaluation Date: 2/28/2018  Type of Evaluation: Initial  Physician Order: PT Eval and Treat    Presenting Problem: sepsis, abnormal EKG, pain  Reason for Therapy: Mobility Dysfunction bed?: A Little   How much help from another person does the patient currently need. ..   -   Moving to and from a bed to a chair (including a wheelchair)?: A Little   -   Need to walk in hospital room?: A Little   -   Climbing 3-5 steps with a railing?: A L is able to demonstrate transfers Sit to/from Stand at assistance level: independent     Goal #3 Patient is able to ambulate 400 feet with assist device: walker - rolling at assistance level: independent     Goal #4    Goal #5    Goal #6    Goal Comments: G

## 2018-02-28 NOTE — OCCUPATIONAL THERAPY NOTE
OCCUPATIONAL THERAPY EVALUATION - INPATIENT      Room Number: 423/423-A  Evaluation Date: 2/28/2018  Type of Evaluation: Re-evaluation  Presenting Problem: sepsis    Physician Order: IP Consult to Occupational Therapy  Reason for Therapy: ADL/IADL Dysfunct inpatient OT to address the above deficits, maximizing patient's ability to return to prior level of function. DISCHARGE RECOMMENDATIONS  OT Discharge Recommendations: Home;Outpatient PT  OT Device Recommendations: Transfer tub bench; Sock aid;Reacher; Lo Status:  WFL - within functional limits    RANGE OF MOTION   Upper extremity ROM is within functional limits     STRENGTH ASSESSMENT  Upper extremity strength is within functional limits     NEUROLOGICAL FINDINGS  Neurological Findings: None independently recall and maintain spine precautions w/ <2 prompts.    Comment:         Goals  on: 3/18/2018  Frequency: 3x/week    Jessica Gaytan OTR/L  [facility]

## 2018-02-28 NOTE — CM/SW NOTE
KAREN met with the pt. And his wife at bedside as SW was notified the pt. Will be needing long term IV abx. The pt. Is currently listed at Medicare Part A only on the facesheet, but copied insurance card has Medicare Part A and B listed.   The pt's wife is un

## 2018-02-28 NOTE — PROGRESS NOTES
[facility]    Progress Note    Carlos Orlando Patient Status:  Inpatient    1950 MRN [id number]   Location [facility] 4W/SW/SE Attending Darrius Yoder, *   Hosp Day # 5 PCP MD Jose Oden CA 7.4 (L) 02/28/2018   ALB 2.9 (L) 02/23/2018   ALKPHO 60 02/23/2018   BILT 0.9 02/23/2018   TP 6.3 02/23/2018   AST 43 (H) 02/23/2018   ALT 42 02/23/2018   PSA 1.4 05/09/2017       Mri Spine Thoracic (w+wo) (cpt=72157)    Result Date: 2/27/2018  CONCLU marked resultant effacement of the thecal sac with severe spinal canal stenosis. Marked additional asymmetric edema and enlargement of the left psoas muscle, which is presumably related to extension of infection.  Multiple intramuscular abscesses are noted included in the field of imaging. The visualized portion of this finding measures at least 39 x 46 x 109 mm (AP x transverse x CC).   The sacroiliac joints and pubic symphysis are normal.  There is no marrow signal abnormality or osseous malalignment invol

## 2018-02-28 NOTE — PROGRESS NOTES
[facility]    Neurosurgery Progress Note    Erickson Corona Patient Status:  Inpatient    1950 MRN [id number]   Location [facility] 4W/SW/SE Attending Micheline Koyanagi, [address] [place] [address] Day # 5 PCP ARNAUD THOMAS, Cyclobenzaprine HCl (cyclobenzaprine) tab 10 mg, 10 mg, Oral, Nightly PRN  •  lisinopril (PRINIVIL,ZESTRIL) tab 20 mg, 20 mg, Oral, Daily    Labs:    Lab Results  Component Value Date   WBC 10.4 02/23/2018   HGB 11.3 (L) 02/23/2018   PLT 96 (L) 02/23/2018 characterized on this nondedicated exam. 8. Nonspecific moderate to severe gastric distention. 9. Lesser incidental findings as above. Mri Spine Lumbar (w+wo) (cpt=72158)    Result Date: 2/27/2018  CONCLUSION:  1.  Significantly motion compromised e within the visualized lower lumbar spine. There is fluid within the L3-4 disc, and there is marrow edema in the L4 vertebral body. L4 is not well seen on axial T1 imaging. There is patient motion artifact which for small-sized abnormalities.   A cystic e field of imaging and therefore not completely assessed. Correlate with lumbar spine MRI imaging which has been ordered but not yet completed at time of this dictation for better delineation of these findings. 2.  No acute abnormality within the left hip.

## 2018-02-28 NOTE — CM/SW NOTE
CTL progression of care note: spoke with pt's rn - pt to have PICC line placed tomorrow. Per ID PA notes - anticipate IV antibiotics for 8 weeks. SW notified of above.

## 2018-02-28 NOTE — OPERATIVE REPORT
[facility]    PATIENT'S NAME: [facility]   ATTENDING PHYSICIAN: Moody Iniguez MD   OPERATING PHYSICIAN: Jean Sun MD   PATIENT ACCOUNT#:   [id number]    LOCATION:  [facility] #:   [id number]       DATE OF of incision. After time-out, we infiltrated the incision with our usual local anesthetic and incised utilizing a 10 blade. The fascia was opened sharply. We dissected down in a periosteal/subperiosteal fashion and identified L3 and L4.   Self-retaining r a Biopatch and a Tegaderm. The patient was awakened, extubated, and brought to recovery in stable condition. There were no complications. The count was correct at the end of the case.     Dictated By Yasmine Dalal MD  d: 02/27/2018 18:46:50  t: 02/28/2018

## 2018-02-28 NOTE — ANESTHESIA POSTPROCEDURE EVALUATION
Patient: Scotty Hinton    Procedure Summary     Date:  02/27/18 Room / Location:  [address] [place] [address] MAIN OR 05 / [address] [place] [address] MAIN OR    Anesthesia Start:  5408 Anesthesia Stop:      Procedure:  LUMBAR LAMINECTOMY 1 LEVEL (N/A Spine Lumbar) Diagnosis:       Sepsis ([facility] [address]

## 2018-03-01 LAB
CK SERPL-CCNC: 122 U/L (ref 49–397)
POTASSIUM SERPL-SCNC: 3.9 MMOL/L (ref 3.3–5.1)

## 2018-03-01 PROCEDURE — 82550 ASSAY OF CK (CPK): CPT | Performed by: INTERNAL MEDICINE

## 2018-03-01 PROCEDURE — 02HV33Z INSERTION OF INFUSION DEVICE INTO SUPERIOR VENA CAVA, PERCUTANEOUS APPROACH: ICD-10-PCS | Performed by: INTERNAL MEDICINE

## 2018-03-01 PROCEDURE — 97116 GAIT TRAINING THERAPY: CPT

## 2018-03-01 PROCEDURE — 76937 US GUIDE VASCULAR ACCESS: CPT

## 2018-03-01 PROCEDURE — 36569 INSJ PICC 5 YR+ W/O IMAGING: CPT

## 2018-03-01 PROCEDURE — A4216 STERILE WATER/SALINE, 10 ML: HCPCS | Performed by: NURSE PRACTITIONER

## 2018-03-01 PROCEDURE — 84132 ASSAY OF SERUM POTASSIUM: CPT | Performed by: INTERNAL MEDICINE

## 2018-03-01 PROCEDURE — 97530 THERAPEUTIC ACTIVITIES: CPT

## 2018-03-01 RX ORDER — SODIUM CHLORIDE 0.9 % (FLUSH) 0.9 %
10 SYRINGE (ML) INJECTION AS NEEDED
Status: DISCONTINUED | OUTPATIENT
Start: 2018-03-01 | End: 2018-03-02

## 2018-03-01 RX ORDER — LIDOCAINE HYDROCHLORIDE 10 MG/ML
0.5 INJECTION, SOLUTION INFILTRATION; PERINEURAL ONCE AS NEEDED
Status: ACTIVE | OUTPATIENT
Start: 2018-03-01 | End: 2018-03-01

## 2018-03-01 NOTE — PROGRESS NOTES
[facility] HOSP - [facility]    Cardiology Progress Note    Roberto Carlos [place] Patient Status:  Inpatient    1950 MRN [id number]   Location [facility] 4W/SW/SE Attending Zack Powell, *   Hosp Day # 6 PCP Oumou Florence MD Allergies    Medications:    Current Facility-Administered Medications:  [MAR Hold] Normal Saline Flush 0.9 % injection 10 mL 10 mL Intravenous PRN   [MAR Hold] Normal Saline Flush 0.9 % injection 10 mL 10 mL Intravenous PRN   [MAR Hold] 0.9%  NaCl infusio

## 2018-03-01 NOTE — PHYSICAL THERAPY NOTE
Pt refused secondary to dizziness, vitals assessed 121/82 mmHg & 95 SPO2.  RN informed of patients symptoms

## 2018-03-01 NOTE — PROGRESS NOTES
[facility] ID PROGRESS NOTE    Irais Samuels Patient Status:  Inpatient    1950 MRN [id number]   Location [facility] 5SW/SE Attending Chan Curry, [address] Day # 6 PCP Omi Esteves MD     Subjective:  Patient sitting up shortness of breath, rashes, recent travel. Patient is a  but denies injury from fish bones at work. No recent hospitalizations. No recent antibiotics as an outpatient.  Of note patient does get superificial burns at work which he scratches.     # L3

## 2018-03-01 NOTE — PROGRESS NOTES
[facility]    Progress Note    Marquis Mahmood Patient Status:  Inpatient    1950 MRN [id number]   Location [facility] 4W/SW/SE Attending Haim Mahoney, *   Hosp Day # 6 PCP MD Dianna Reyes 02/28/2018    (H) 02/28/2018   CA 7.4 (L) 02/28/2018   ALB 2.9 (L) 02/23/2018   ALKPHO 60 02/23/2018   BILT 0.9 02/23/2018   TP 6.3 02/23/2018   AST 43 (H) 02/23/2018   ALT 42 02/23/2018   PSA 1.4 05/09/2017    03/01/2018       Xr C-arm Fluoro

## 2018-03-01 NOTE — PHYSICAL THERAPY NOTE
PHYSICAL THERAPY TREATMENT NOTE - INPATIENT     Room Number: 423/423-A       Presenting Problem: sepsis, abnormal EKG, pain    Problem List  Active Problems:    Sepsis, Gram positive ([facility] [address]    Sepsis ([facility] [address]      ASSESSMENT   Pt has been feeling ill with dizz CK    FUNCTIONAL ABILITY STATUS  Gait Assessment   Gait Assistance:  (CGA)  430ft  Assistive Device: Rolling walker  Pattern: L Decreased stance time  Stoop/Curb Assistance: Not tested      THERAPEUTIC EXERCISES  Lower Extremity Ankle pumps  20x   Position

## 2018-03-02 VITALS
HEIGHT: 69.02 IN | SYSTOLIC BLOOD PRESSURE: 143 MMHG | HEART RATE: 93 BPM | DIASTOLIC BLOOD PRESSURE: 65 MMHG | WEIGHT: 225.88 LBS | TEMPERATURE: 100 F | OXYGEN SATURATION: 91 % | RESPIRATION RATE: 16 BRPM | BODY MASS INDEX: 33.46 KG/M2

## 2018-03-02 PROBLEM — G06.2 EPIDURAL ABSCESS: Status: ACTIVE | Noted: 2018-03-02

## 2018-03-02 LAB
ALBUMIN SERPL BCP-MCNC: 2.3 G/DL (ref 3.5–4.8)
ALBUMIN/GLOB SERPL: 0.6 {RATIO} (ref 1–2)
ALP SERPL-CCNC: 48 U/L (ref 32–100)
ALT SERPL-CCNC: 34 U/L (ref 17–63)
ANION GAP SERPL CALC-SCNC: 4 MMOL/L (ref 0–18)
AST SERPL-CCNC: 39 U/L (ref 15–41)
BASOPHILS # BLD: 0.1 K/UL (ref 0–0.2)
BASOPHILS NFR BLD: 1 %
BILIRUB SERPL-MCNC: 0.7 MG/DL (ref 0.3–1.2)
BUN SERPL-MCNC: 12 MG/DL (ref 8–20)
BUN/CREAT SERPL: 17.9 (ref 10–20)
CALCIUM SERPL-MCNC: 8.1 MG/DL (ref 8.5–10.5)
CHLORIDE SERPL-SCNC: 99 MMOL/L (ref 95–110)
CO2 SERPL-SCNC: 32 MMOL/L (ref 22–32)
CREAT SERPL-MCNC: 0.67 MG/DL (ref 0.5–1.5)
EOSINOPHIL # BLD: 0.2 K/UL (ref 0–0.7)
EOSINOPHIL NFR BLD: 2 %
ERYTHROCYTE [DISTWIDTH] IN BLOOD BY AUTOMATED COUNT: 14.5 % (ref 11–15)
GLOBULIN PLAS-MCNC: 3.8 G/DL (ref 2.5–3.7)
GLUCOSE SERPL-MCNC: 116 MG/DL (ref 70–99)
HCT VFR BLD AUTO: 33 % (ref 41–52)
HGB BLD-MCNC: 11 G/DL (ref 13.5–17.5)
LYMPHOCYTES # BLD: 1.3 K/UL (ref 1–4)
LYMPHOCYTES NFR BLD: 12 %
MCH RBC QN AUTO: 26.6 PG (ref 27–32)
MCHC RBC AUTO-ENTMCNC: 33.2 G/DL (ref 32–37)
MCV RBC AUTO: 80.1 FL (ref 80–100)
MONOCYTES # BLD: 0.4 K/UL (ref 0–1)
MONOCYTES NFR BLD: 4 %
NEUTROPHILS # BLD AUTO: 8.9 K/UL (ref 1.8–7.7)
NEUTROPHILS NFR BLD: 81 %
OSMOLALITY UR CALC.SUM OF ELEC: 281 MOSM/KG (ref 275–295)
PLATELET # BLD AUTO: 239 K/UL (ref 140–400)
PMV BLD AUTO: 8 FL (ref 7.4–10.3)
POTASSIUM SERPL-SCNC: 4.4 MMOL/L (ref 3.3–5.1)
PROT SERPL-MCNC: 6.1 G/DL (ref 5.9–8.4)
RBC # BLD AUTO: 4.12 M/UL (ref 4.5–5.9)
SODIUM SERPL-SCNC: 135 MMOL/L (ref 136–144)
WBC # BLD AUTO: 11 K/UL (ref 4–11)

## 2018-03-02 PROCEDURE — 97110 THERAPEUTIC EXERCISES: CPT

## 2018-03-02 PROCEDURE — 85025 COMPLETE CBC W/AUTO DIFF WBC: CPT | Performed by: INTERNAL MEDICINE

## 2018-03-02 PROCEDURE — A4216 STERILE WATER/SALINE, 10 ML: HCPCS | Performed by: INTERNAL MEDICINE

## 2018-03-02 PROCEDURE — 80053 COMPREHEN METABOLIC PANEL: CPT | Performed by: INTERNAL MEDICINE

## 2018-03-02 PROCEDURE — 97116 GAIT TRAINING THERAPY: CPT

## 2018-03-02 PROCEDURE — 97535 SELF CARE MNGMENT TRAINING: CPT

## 2018-03-02 RX ORDER — POLYETHYLENE GLYCOL 3350 17 G/17G
17 POWDER, FOR SOLUTION ORAL DAILY PRN
Status: DISCONTINUED | OUTPATIENT
Start: 2018-03-02 | End: 2018-03-02

## 2018-03-02 RX ORDER — BISACODYL 10 MG
10 SUPPOSITORY, RECTAL RECTAL
Status: DISCONTINUED | OUTPATIENT
Start: 2018-03-02 | End: 2018-03-02

## 2018-03-02 NOTE — CM/SW NOTE
Plan is for outpt IV abx. Referral has been made to the [facility] and prescription faxed. Waiting on appointment time to notify the pt. And his wife.       [facility] ext [phone]

## 2018-03-02 NOTE — OCCUPATIONAL THERAPY NOTE
OCCUPATIONAL THERAPY TREATMENT NOTE - INPATIENT    Room Number: 423/423-A         Presenting Problem: s/p lumbar lami for abscess 2/27     Problem List  Active Problems:    Sepsis, Gram positive ([facility] [address]    Sepsis ([facility] [address]      ASSESSMENT   Notified RN prior to Toileting, which includes using toilet, bedpan or urinal? : None  -   Putting on and taking off regular upper body clothing?: None  -   Taking care of personal grooming such as brushing teeth?: None  -   Eating meals?: None    AM-PAC Score:  Score: 22  Santos

## 2018-03-02 NOTE — CM/SW NOTE
Plan is for discharge home today 3/2 and the pt. Will follow up with the outpt infusion center. The pt's first appointment is Saturday 3/3 at 26am.  KAREN also provided the wife information on signing up for Medicare Part B.       Isabelle Siddiqui, ANA MARÍA ext [phone]

## 2018-03-02 NOTE — PHYSICAL THERAPY NOTE
PHYSICAL THERAPY TREATMENT NOTE - INPATIENT     Room Number: 423/423-A       Presenting Problem: sepsis, abnormal EKG, pain    Problem List  Active Problems:    Sepsis, Gram positive ([facility] [address]    Sepsis ([facility] [address]      ASSESSMENT   Pt in chair with wife and doc pre currently need. ..   -   Moving to and from a bed to a chair (including a wheelchair)?: A Little   -   Need to walk in hospital room?: A Little   -   Climbing 3-5 steps with a railing?: A Little     AM-PAC Score:  Raw Score: 20   PT Approx Degree of Impairm

## 2018-03-02 NOTE — PROGRESS NOTES
[facility]    Neurosurgery Progress Note    Adonis Wright Patient Status:  Inpatient    1950 MRN [id number]   Location [facility] 4W/SW/SE Attending Lobito Murcia, [address] [facility] [address] Day # 7 PCP ARNAUD THOMAS, 03/02/2018   CO2 32 03/02/2018    (H) 03/02/2018   ALB 2.3 (L) 03/02/2018         Neurological Exam:  AAOx3, following commands  LEs strong 5/5  Sensation symmetrical  Incision c/d/i    Abdomen:  Soft, non-distended, non-tender, with no rebound or g

## 2018-03-02 NOTE — PROGRESS NOTES
[facility] ID PROGRESS NOTE    Scott Cadena Patient Status:  Inpatient    1950 MRN [id number]   Location [facility] 5SW/SE Attending Gina Ríos # 7 PCP Ciaran Vargas MD     Subjective:  Low grade temp.  No note patient does get superificial burns at work which he scratches.     # L3/4 epidural abscess with discitis/osteomyelitis s/p L3-L4 laminectomy 2/27   -POD#1   -OR cx with MSSA  # Psoas abscess  # Acute MSSA septicemia secondary to above   -XR hip/pelvi

## 2018-03-02 NOTE — PROGRESS NOTES
[facility]    Cardiology Progress Note    Elsa Valencia Patient Status:  Inpatient    1950 MRN [id number]   Location [facility] 4W/SW/SE Attending Tyler Blackmon, *   Hosp Day # 7 PCP Yaz Becerril MD 72 hours.     Allergies:   No Known Allergies    Medications:    Current Facility-Administered Medications:  magnesium hydroxide (MILK OF MAGNESIA) 400 MG/5ML suspension 30 mL 30 mL Oral Daily PRN   PEG 3350 (MIRALAX) powder packet 17 g 17 g Oral Daily PRN

## 2018-03-03 ENCOUNTER — OFFICE VISIT (OUTPATIENT)
Dept: HEMATOLOGY/ONCOLOGY | Facility: HOSPITAL | Age: 68
End: 2018-03-03
Attending: INTERNAL MEDICINE
Payer: MEDICAID

## 2018-03-03 VITALS
SYSTOLIC BLOOD PRESSURE: 117 MMHG | HEART RATE: 99 BPM | RESPIRATION RATE: 16 BRPM | TEMPERATURE: 98 F | DIASTOLIC BLOOD PRESSURE: 67 MMHG

## 2018-03-03 DIAGNOSIS — G06.2 EPIDURAL ABSCESS: Primary | ICD-10-CM

## 2018-03-03 PROCEDURE — 96374 THER/PROPH/DIAG INJ IV PUSH: CPT

## 2018-03-03 PROCEDURE — A4216 STERILE WATER/SALINE, 10 ML: HCPCS

## 2018-03-03 PROCEDURE — A4216 STERILE WATER/SALINE, 10 ML: HCPCS | Performed by: INTERNAL MEDICINE

## 2018-03-03 RX ORDER — 0.9 % SODIUM CHLORIDE 0.9 %
VIAL (ML) INJECTION
Status: DISCONTINUED
Start: 2018-03-03 | End: 2018-03-03 | Stop reason: WASHOUT

## 2018-03-03 RX ORDER — 0.9 % SODIUM CHLORIDE 0.9 %
VIAL (ML) INJECTION
Status: DISCONTINUED
Start: 2018-03-03 | End: 2018-03-03

## 2018-03-03 NOTE — PROGRESS NOTES
Patient here for Daptomycin for treatment of epidural Abcess and Sepsis. Patient was discharged yesterday. Denies fever or chills overnight. Tool Norco this morning for back pain.     PICC line patent to Right upper arm, brisk blood return with aspiratio

## 2018-03-04 ENCOUNTER — OFFICE VISIT (OUTPATIENT)
Dept: HEMATOLOGY/ONCOLOGY | Facility: HOSPITAL | Age: 68
End: 2018-03-04
Attending: INTERNAL MEDICINE
Payer: MEDICAID

## 2018-03-04 VITALS
SYSTOLIC BLOOD PRESSURE: 125 MMHG | HEART RATE: 97 BPM | DIASTOLIC BLOOD PRESSURE: 60 MMHG | RESPIRATION RATE: 16 BRPM | TEMPERATURE: 98 F

## 2018-03-04 DIAGNOSIS — G06.2 EPIDURAL ABSCESS: Primary | ICD-10-CM

## 2018-03-04 PROCEDURE — A4216 STERILE WATER/SALINE, 10 ML: HCPCS

## 2018-03-04 PROCEDURE — 96374 THER/PROPH/DIAG INJ IV PUSH: CPT

## 2018-03-04 PROCEDURE — A4216 STERILE WATER/SALINE, 10 ML: HCPCS | Performed by: INTERNAL MEDICINE

## 2018-03-04 RX ORDER — 0.9 % SODIUM CHLORIDE 0.9 %
VIAL (ML) INJECTION
Status: DISCONTINUED
Start: 2018-03-04 | End: 2018-03-04

## 2018-03-04 NOTE — DISCHARGE SUMMARY
[facility]    PATIENT'S NAME: Marshall Yimi   ATTENDING PHYSICIAN: Abel Tovar MD   PATIENT ACCOUNT#:   [id number]    LOCATION:  [address] [place] [address] RECORD #:   [id number]       YOB: 1950  ADMISSION DATE:

## 2018-03-04 NOTE — PROGRESS NOTES
Patient here for Daptomycin for treatment of epidural Abcess and Sepsis. .  Denies fever or chills overnight. Tool Norco this morning for back pain. PICC line patent to Right upper arm, brisk blood return with aspiration. Flushes without difficulty.

## 2018-03-05 ENCOUNTER — OFFICE VISIT (OUTPATIENT)
Dept: HEMATOLOGY/ONCOLOGY | Facility: HOSPITAL | Age: 68
End: 2018-03-05
Attending: INTERNAL MEDICINE
Payer: MEDICAID

## 2018-03-05 VITALS
HEART RATE: 95 BPM | TEMPERATURE: 99 F | SYSTOLIC BLOOD PRESSURE: 160 MMHG | DIASTOLIC BLOOD PRESSURE: 71 MMHG | RESPIRATION RATE: 16 BRPM

## 2018-03-05 DIAGNOSIS — G06.2 EPIDURAL ABSCESS: Primary | ICD-10-CM

## 2018-03-05 PROCEDURE — 96374 THER/PROPH/DIAG INJ IV PUSH: CPT

## 2018-03-05 PROCEDURE — A4216 STERILE WATER/SALINE, 10 ML: HCPCS | Performed by: INTERNAL MEDICINE

## 2018-03-05 NOTE — PROGRESS NOTES
Patient arrives for daily antibiotics for epidural abcess. Patient reports he is feeling well, states he is fatigued. PICC line present to right upper arm, PICC flushed with saline, positive blood return noted.  Daptomycin given over ten minutes, patient to

## 2018-03-05 NOTE — TRANSITION NOTE
[facility] - [facility]    Cardiology Discharge Summary    Agus Roger Patient Status:  Inpatient    1950 MRN [id number]   Location [facility] 4W/SW/SE Attending No att. providers found   Hosp Day # 7 PCP [facility] Shashi, M mouth daily as needed for Pain. Refills:  0     lisinopril 20 MG Tabs  Commonly known as:  PRINIVIL,ZESTRIL      Take 20 mg by mouth daily.    Refills:  0           Where to Get Your Medications      Please  your prescriptions at the location [facility]

## 2018-03-06 ENCOUNTER — OFFICE VISIT (OUTPATIENT)
Dept: HEMATOLOGY/ONCOLOGY | Facility: HOSPITAL | Age: 68
End: 2018-03-06
Attending: INTERNAL MEDICINE
Payer: MEDICAID

## 2018-03-06 VITALS
DIASTOLIC BLOOD PRESSURE: 55 MMHG | SYSTOLIC BLOOD PRESSURE: 130 MMHG | HEART RATE: 90 BPM | RESPIRATION RATE: 16 BRPM | TEMPERATURE: 98 F

## 2018-03-06 DIAGNOSIS — G06.2 EPIDURAL ABSCESS: Primary | ICD-10-CM

## 2018-03-06 PROCEDURE — A4216 STERILE WATER/SALINE, 10 ML: HCPCS

## 2018-03-06 PROCEDURE — A4216 STERILE WATER/SALINE, 10 ML: HCPCS | Performed by: INTERNAL MEDICINE

## 2018-03-06 PROCEDURE — 96374 THER/PROPH/DIAG INJ IV PUSH: CPT

## 2018-03-06 RX ORDER — 0.9 % SODIUM CHLORIDE 0.9 %
VIAL (ML) INJECTION
Status: DISPENSED
Start: 2018-03-06 | End: 2018-03-06

## 2018-03-06 NOTE — PROGRESS NOTES
Patient arrives for daily antibiotics for epidural abcess. Patient reports he is feeling well,he offers no complaints at this time. He is pain free and ambulates well with walker . Yee Master  PICC line present to right upper arm, PICC flushed with saline, positive b

## 2018-03-07 ENCOUNTER — OFFICE VISIT (OUTPATIENT)
Dept: HEMATOLOGY/ONCOLOGY | Facility: HOSPITAL | Age: 68
End: 2018-03-07
Attending: INTERNAL MEDICINE
Payer: MEDICAID

## 2018-03-07 VITALS
DIASTOLIC BLOOD PRESSURE: 67 MMHG | HEART RATE: 88 BPM | TEMPERATURE: 99 F | SYSTOLIC BLOOD PRESSURE: 135 MMHG | RESPIRATION RATE: 16 BRPM

## 2018-03-07 DIAGNOSIS — G06.2 EPIDURAL ABSCESS: Primary | ICD-10-CM

## 2018-03-07 LAB
ALBUMIN SERPL BCP-MCNC: 2.5 G/DL (ref 3.5–4.8)
ALBUMIN/GLOB SERPL: 0.6 {RATIO} (ref 1–2)
ALP SERPL-CCNC: 60 U/L (ref 32–100)
ALT SERPL-CCNC: 74 U/L (ref 17–63)
ANION GAP SERPL CALC-SCNC: 10 MMOL/L (ref 0–18)
AST SERPL-CCNC: 50 U/L (ref 15–41)
BASOPHILS # BLD: 0 K/UL (ref 0–0.2)
BASOPHILS NFR BLD: 0 %
BILIRUB SERPL-MCNC: 0.5 MG/DL (ref 0.3–1.2)
BUN SERPL-MCNC: 14 MG/DL (ref 8–20)
BUN/CREAT SERPL: 19.7 (ref 10–20)
CALCIUM SERPL-MCNC: 8.4 MG/DL (ref 8.5–10.5)
CHLORIDE SERPL-SCNC: 93 MMOL/L (ref 95–110)
CK SERPL-CCNC: 63 U/L (ref 49–397)
CO2 SERPL-SCNC: 28 MMOL/L (ref 22–32)
CREAT SERPL-MCNC: 0.71 MG/DL (ref 0.5–1.5)
CRP SERPL-MCNC: 16.9 MG/DL (ref 0–0.9)
EOSINOPHIL # BLD: 0.1 K/UL (ref 0–0.7)
EOSINOPHIL NFR BLD: 1 %
ERYTHROCYTE [DISTWIDTH] IN BLOOD BY AUTOMATED COUNT: 14.5 % (ref 11–15)
ERYTHROCYTE [SEDIMENTATION RATE] IN BLOOD: 76 MM/HR (ref 0–20)
GLOBULIN PLAS-MCNC: 4.3 G/DL (ref 2.5–3.7)
GLUCOSE SERPL-MCNC: 122 MG/DL (ref 70–99)
HCT VFR BLD AUTO: 31.9 % (ref 41–52)
HGB BLD-MCNC: 10.4 G/DL (ref 13.5–17.5)
LYMPHOCYTES # BLD: 1.1 K/UL (ref 1–4)
LYMPHOCYTES NFR BLD: 10 %
MCH RBC QN AUTO: 26.3 PG (ref 27–32)
MCHC RBC AUTO-ENTMCNC: 32.7 G/DL (ref 32–37)
MCV RBC AUTO: 80.3 FL (ref 80–100)
MONOCYTES # BLD: 1 K/UL (ref 0–1)
MONOCYTES NFR BLD: 10 %
NEUTROPHILS # BLD AUTO: 8.5 K/UL (ref 1.8–7.7)
NEUTROPHILS NFR BLD: 79 %
OSMOLALITY UR CALC.SUM OF ELEC: 274 MOSM/KG (ref 275–295)
PATIENT FASTING: NO
PLATELET # BLD AUTO: 316 K/UL (ref 140–400)
PMV BLD AUTO: 8.3 FL (ref 7.4–10.3)
POTASSIUM SERPL-SCNC: 4.2 MMOL/L (ref 3.3–5.1)
PROT SERPL-MCNC: 6.8 G/DL (ref 5.9–8.4)
RBC # BLD AUTO: 3.97 M/UL (ref 4.5–5.9)
SODIUM SERPL-SCNC: 131 MMOL/L (ref 136–144)
WBC # BLD AUTO: 10.8 K/UL (ref 4–11)

## 2018-03-07 PROCEDURE — 96374 THER/PROPH/DIAG INJ IV PUSH: CPT

## 2018-03-07 PROCEDURE — 85652 RBC SED RATE AUTOMATED: CPT

## 2018-03-07 PROCEDURE — 86140 C-REACTIVE PROTEIN: CPT

## 2018-03-07 PROCEDURE — A4216 STERILE WATER/SALINE, 10 ML: HCPCS

## 2018-03-07 PROCEDURE — A4216 STERILE WATER/SALINE, 10 ML: HCPCS | Performed by: INTERNAL MEDICINE

## 2018-03-07 PROCEDURE — 85025 COMPLETE CBC W/AUTO DIFF WBC: CPT

## 2018-03-07 PROCEDURE — 82550 ASSAY OF CK (CPK): CPT

## 2018-03-07 PROCEDURE — 80053 COMPREHEN METABOLIC PANEL: CPT

## 2018-03-07 RX ORDER — 0.9 % SODIUM CHLORIDE 0.9 %
VIAL (ML) INJECTION
Status: DISCONTINUED
Start: 2018-03-07 | End: 2018-03-07

## 2018-03-07 NOTE — PROGRESS NOTES
Patient arrives for daily antibiotics for epidural abcess. Patient reports he is feeling well, states he is fatigued. PICC line present to right upper arm, PICC flushed with saline, positive blood return noted. Labs collected from PICC.   Daptomycin given o

## 2018-03-08 ENCOUNTER — OFFICE VISIT (OUTPATIENT)
Dept: HEMATOLOGY/ONCOLOGY | Facility: HOSPITAL | Age: 68
End: 2018-03-08
Attending: INTERNAL MEDICINE
Payer: MEDICAID

## 2018-03-08 VITALS
RESPIRATION RATE: 16 BRPM | SYSTOLIC BLOOD PRESSURE: 125 MMHG | DIASTOLIC BLOOD PRESSURE: 62 MMHG | TEMPERATURE: 99 F | HEART RATE: 93 BPM

## 2018-03-08 DIAGNOSIS — G06.2 EPIDURAL ABSCESS: Primary | ICD-10-CM

## 2018-03-08 PROCEDURE — 96374 THER/PROPH/DIAG INJ IV PUSH: CPT

## 2018-03-08 PROCEDURE — A4216 STERILE WATER/SALINE, 10 ML: HCPCS | Performed by: INTERNAL MEDICINE

## 2018-03-08 PROCEDURE — A4216 STERILE WATER/SALINE, 10 ML: HCPCS

## 2018-03-08 RX ORDER — 0.9 % SODIUM CHLORIDE 0.9 %
VIAL (ML) INJECTION
Status: DISCONTINUED
Start: 2018-03-08 | End: 2018-03-08

## 2018-03-08 NOTE — PROGRESS NOTES
Patient arrives for daily antibiotics for epidural abcess. Patient reports he is feeling well, states he is fatigued. PICC line present to right upper arm, PICC line dressing change done today.   PICC line clean and dry, no redness or swelling noted at site

## 2018-03-09 ENCOUNTER — OFFICE VISIT (OUTPATIENT)
Dept: HEMATOLOGY/ONCOLOGY | Facility: HOSPITAL | Age: 68
End: 2018-03-09
Attending: INTERNAL MEDICINE
Payer: MEDICAID

## 2018-03-09 VITALS
DIASTOLIC BLOOD PRESSURE: 68 MMHG | SYSTOLIC BLOOD PRESSURE: 146 MMHG | TEMPERATURE: 99 F | HEART RATE: 102 BPM | RESPIRATION RATE: 16 BRPM

## 2018-03-09 DIAGNOSIS — G06.2 EPIDURAL ABSCESS: Primary | ICD-10-CM

## 2018-03-09 PROCEDURE — A4216 STERILE WATER/SALINE, 10 ML: HCPCS | Performed by: INTERNAL MEDICINE

## 2018-03-09 PROCEDURE — 96374 THER/PROPH/DIAG INJ IV PUSH: CPT

## 2018-03-09 PROCEDURE — A4216 STERILE WATER/SALINE, 10 ML: HCPCS

## 2018-03-09 RX ORDER — 0.9 % SODIUM CHLORIDE 0.9 %
VIAL (ML) INJECTION
Status: DISCONTINUED
Start: 2018-03-09 | End: 2018-03-09

## 2018-03-10 ENCOUNTER — OFFICE VISIT (OUTPATIENT)
Dept: HEMATOLOGY/ONCOLOGY | Facility: HOSPITAL | Age: 68
End: 2018-03-10
Attending: INTERNAL MEDICINE
Payer: MEDICAID

## 2018-03-10 VITALS
DIASTOLIC BLOOD PRESSURE: 65 MMHG | SYSTOLIC BLOOD PRESSURE: 130 MMHG | HEART RATE: 88 BPM | RESPIRATION RATE: 16 BRPM | TEMPERATURE: 99 F

## 2018-03-10 DIAGNOSIS — G06.2 EPIDURAL ABSCESS: Primary | ICD-10-CM

## 2018-03-10 PROCEDURE — A4216 STERILE WATER/SALINE, 10 ML: HCPCS

## 2018-03-10 PROCEDURE — 96374 THER/PROPH/DIAG INJ IV PUSH: CPT

## 2018-03-10 PROCEDURE — A4216 STERILE WATER/SALINE, 10 ML: HCPCS | Performed by: INTERNAL MEDICINE

## 2018-03-10 RX ORDER — 0.9 % SODIUM CHLORIDE 0.9 %
VIAL (ML) INJECTION
Status: DISCONTINUED
Start: 2018-03-10 | End: 2018-03-10

## 2018-03-10 NOTE — PROGRESS NOTES
Patient arrives for daily antibiotics for epidural abcess. Patient reports he is feeling well, states he is fatigued. PICC line present to right upper arm, PICC line dressing changed early because tegaderm was peeling away.   PICC line clean and dry, no red

## 2018-03-11 ENCOUNTER — OFFICE VISIT (OUTPATIENT)
Dept: HEMATOLOGY/ONCOLOGY | Facility: HOSPITAL | Age: 68
End: 2018-03-11
Attending: INTERNAL MEDICINE
Payer: MEDICAID

## 2018-03-11 VITALS
RESPIRATION RATE: 16 BRPM | HEART RATE: 90 BPM | DIASTOLIC BLOOD PRESSURE: 66 MMHG | SYSTOLIC BLOOD PRESSURE: 125 MMHG | TEMPERATURE: 98 F

## 2018-03-11 DIAGNOSIS — G06.2 EPIDURAL ABSCESS: Primary | ICD-10-CM

## 2018-03-11 PROCEDURE — 96374 THER/PROPH/DIAG INJ IV PUSH: CPT

## 2018-03-11 PROCEDURE — A4216 STERILE WATER/SALINE, 10 ML: HCPCS

## 2018-03-11 RX ORDER — 0.9 % SODIUM CHLORIDE 0.9 %
VIAL (ML) INJECTION
Status: DISCONTINUED
Start: 2018-03-11 | End: 2018-03-11

## 2018-03-11 NOTE — PROGRESS NOTES
Patient arrives for daily antibiotics for epidural abcess. Patient reports he is feeling well, complaints of feeling tired. PICC line present to right upper arm, dressing is clean, dry, intact.     Daptomycin given over 2 minutes, pt appeared to tolerate we

## 2018-03-12 ENCOUNTER — OFFICE VISIT (OUTPATIENT)
Dept: HEMATOLOGY/ONCOLOGY | Facility: HOSPITAL | Age: 68
End: 2018-03-12
Attending: INTERNAL MEDICINE
Payer: MEDICAID

## 2018-03-12 VITALS
RESPIRATION RATE: 16 BRPM | HEART RATE: 95 BPM | SYSTOLIC BLOOD PRESSURE: 130 MMHG | DIASTOLIC BLOOD PRESSURE: 68 MMHG | TEMPERATURE: 98 F

## 2018-03-12 DIAGNOSIS — G06.2 EPIDURAL ABSCESS: Primary | ICD-10-CM

## 2018-03-12 LAB
ALBUMIN SERPL BCP-MCNC: 2.6 G/DL (ref 3.5–4.8)
ALBUMIN/GLOB SERPL: 0.6 {RATIO} (ref 1–2)
ALP SERPL-CCNC: 67 U/L (ref 32–100)
ALT SERPL-CCNC: 82 U/L (ref 17–63)
ANION GAP SERPL CALC-SCNC: 9 MMOL/L (ref 0–18)
AST SERPL-CCNC: 51 U/L (ref 15–41)
BASOPHILS # BLD: 0 K/UL (ref 0–0.2)
BASOPHILS NFR BLD: 1 %
BILIRUB SERPL-MCNC: 0.3 MG/DL (ref 0.3–1.2)
BUN SERPL-MCNC: 15 MG/DL (ref 8–20)
BUN/CREAT SERPL: 22.1 (ref 10–20)
CALCIUM SERPL-MCNC: 8.6 MG/DL (ref 8.5–10.5)
CHLORIDE SERPL-SCNC: 98 MMOL/L (ref 95–110)
CK SERPL-CCNC: 212 U/L (ref 49–397)
CO2 SERPL-SCNC: 28 MMOL/L (ref 22–32)
CREAT SERPL-MCNC: 0.68 MG/DL (ref 0.5–1.5)
CRP SERPL-MCNC: 8.5 MG/DL (ref 0–0.9)
EOSINOPHIL # BLD: 0.3 K/UL (ref 0–0.7)
EOSINOPHIL NFR BLD: 3 %
ERYTHROCYTE [DISTWIDTH] IN BLOOD BY AUTOMATED COUNT: 14.5 % (ref 11–15)
ERYTHROCYTE [SEDIMENTATION RATE] IN BLOOD: 88 MM/HR (ref 0–20)
GLOBULIN PLAS-MCNC: 4.3 G/DL (ref 2.5–3.7)
GLUCOSE SERPL-MCNC: 137 MG/DL (ref 70–99)
HCT VFR BLD AUTO: 32.5 % (ref 41–52)
HGB BLD-MCNC: 10.8 G/DL (ref 13.5–17.5)
LYMPHOCYTES # BLD: 1.2 K/UL (ref 1–4)
LYMPHOCYTES NFR BLD: 14 %
MCH RBC QN AUTO: 26.6 PG (ref 27–32)
MCHC RBC AUTO-ENTMCNC: 33.3 G/DL (ref 32–37)
MCV RBC AUTO: 79.8 FL (ref 80–100)
MONOCYTES # BLD: 0.8 K/UL (ref 0–1)
MONOCYTES NFR BLD: 9 %
NEUTROPHILS # BLD AUTO: 6.3 K/UL (ref 1.8–7.7)
NEUTROPHILS NFR BLD: 74 %
OSMOLALITY UR CALC.SUM OF ELEC: 283 MOSM/KG (ref 275–295)
PATIENT FASTING: NO
PLATELET # BLD AUTO: 240 K/UL (ref 140–400)
PMV BLD AUTO: 7.8 FL (ref 7.4–10.3)
POTASSIUM SERPL-SCNC: 4 MMOL/L (ref 3.3–5.1)
PROT SERPL-MCNC: 6.9 G/DL (ref 5.9–8.4)
RBC # BLD AUTO: 4.07 M/UL (ref 4.5–5.9)
SODIUM SERPL-SCNC: 135 MMOL/L (ref 136–144)
WBC # BLD AUTO: 8.6 K/UL (ref 4–11)

## 2018-03-12 PROCEDURE — A4216 STERILE WATER/SALINE, 10 ML: HCPCS | Performed by: INTERNAL MEDICINE

## 2018-03-12 PROCEDURE — 86140 C-REACTIVE PROTEIN: CPT

## 2018-03-12 PROCEDURE — 85652 RBC SED RATE AUTOMATED: CPT

## 2018-03-12 PROCEDURE — 85025 COMPLETE CBC W/AUTO DIFF WBC: CPT

## 2018-03-12 PROCEDURE — A4216 STERILE WATER/SALINE, 10 ML: HCPCS

## 2018-03-12 PROCEDURE — 82550 ASSAY OF CK (CPK): CPT

## 2018-03-12 PROCEDURE — 80053 COMPREHEN METABOLIC PANEL: CPT

## 2018-03-12 PROCEDURE — 96374 THER/PROPH/DIAG INJ IV PUSH: CPT

## 2018-03-12 RX ORDER — 0.9 % SODIUM CHLORIDE 0.9 %
VIAL (ML) INJECTION
Status: DISCONTINUED
Start: 2018-03-12 | End: 2018-03-12

## 2018-03-12 NOTE — PROGRESS NOTES
Patient arrives for daily antibiotics for epidural abcess. Patient reports he is feeling well, denies any fever. Does c/o intermittent back pain rated at 5 on scale. Taking norco prn.  Presently holding his asa until told otherwise by MD . PICC line present

## 2018-03-13 ENCOUNTER — OFFICE VISIT (OUTPATIENT)
Dept: HEMATOLOGY/ONCOLOGY | Facility: HOSPITAL | Age: 68
End: 2018-03-13
Attending: INTERNAL MEDICINE
Payer: MEDICAID

## 2018-03-13 VITALS
SYSTOLIC BLOOD PRESSURE: 127 MMHG | RESPIRATION RATE: 16 BRPM | HEART RATE: 91 BPM | TEMPERATURE: 98 F | DIASTOLIC BLOOD PRESSURE: 67 MMHG

## 2018-03-13 DIAGNOSIS — G06.2 EPIDURAL ABSCESS: Primary | ICD-10-CM

## 2018-03-13 PROCEDURE — A4216 STERILE WATER/SALINE, 10 ML: HCPCS

## 2018-03-13 PROCEDURE — 96374 THER/PROPH/DIAG INJ IV PUSH: CPT

## 2018-03-13 PROCEDURE — A4216 STERILE WATER/SALINE, 10 ML: HCPCS | Performed by: INTERNAL MEDICINE

## 2018-03-13 RX ORDER — 0.9 % SODIUM CHLORIDE 0.9 %
VIAL (ML) INJECTION
Status: DISCONTINUED
Start: 2018-03-13 | End: 2018-03-13

## 2018-03-13 NOTE — PROGRESS NOTES
Patient here for Daptomycin for treatment of Epidural Abcess. Patient wife states when they got home patient had episode of chills, states chills lasted for 5 minutes, No fever, Temp was 97.8.   Wife states she gave Dementrios Tylenol, orange juice and adriana
No

## 2018-03-14 ENCOUNTER — OFFICE VISIT (OUTPATIENT)
Dept: HEMATOLOGY/ONCOLOGY | Facility: HOSPITAL | Age: 68
End: 2018-03-14
Attending: INTERNAL MEDICINE
Payer: MEDICAID

## 2018-03-14 VITALS
HEART RATE: 92 BPM | TEMPERATURE: 98 F | DIASTOLIC BLOOD PRESSURE: 63 MMHG | SYSTOLIC BLOOD PRESSURE: 112 MMHG | RESPIRATION RATE: 16 BRPM

## 2018-03-14 DIAGNOSIS — G06.2 EPIDURAL ABSCESS: Primary | ICD-10-CM

## 2018-03-14 PROCEDURE — A4216 STERILE WATER/SALINE, 10 ML: HCPCS | Performed by: INTERNAL MEDICINE

## 2018-03-14 PROCEDURE — A4216 STERILE WATER/SALINE, 10 ML: HCPCS

## 2018-03-14 PROCEDURE — 96374 THER/PROPH/DIAG INJ IV PUSH: CPT

## 2018-03-14 RX ORDER — 0.9 % SODIUM CHLORIDE 0.9 %
VIAL (ML) INJECTION
Status: DISCONTINUED
Start: 2018-03-14 | End: 2018-03-14

## 2018-03-14 NOTE — PROGRESS NOTES
Patient arrives for daily antibiotics for epidural abscess. Patient reports he is feeling well, states he is fatigued with some pain if he walks too much. No chills today.       PICC line present to right upper arm, PICC line dressing PICC line dressing cl

## 2018-03-15 ENCOUNTER — OFFICE VISIT (OUTPATIENT)
Dept: HEMATOLOGY/ONCOLOGY | Facility: HOSPITAL | Age: 68
End: 2018-03-15
Attending: INTERNAL MEDICINE
Payer: MEDICAID

## 2018-03-15 VITALS
TEMPERATURE: 98 F | DIASTOLIC BLOOD PRESSURE: 79 MMHG | SYSTOLIC BLOOD PRESSURE: 174 MMHG | RESPIRATION RATE: 16 BRPM | HEART RATE: 92 BPM

## 2018-03-15 DIAGNOSIS — G06.2 EPIDURAL ABSCESS: Primary | ICD-10-CM

## 2018-03-15 PROCEDURE — A4216 STERILE WATER/SALINE, 10 ML: HCPCS

## 2018-03-15 PROCEDURE — A4216 STERILE WATER/SALINE, 10 ML: HCPCS | Performed by: INTERNAL MEDICINE

## 2018-03-15 PROCEDURE — 96374 THER/PROPH/DIAG INJ IV PUSH: CPT

## 2018-03-15 RX ORDER — 0.9 % SODIUM CHLORIDE 0.9 %
VIAL (ML) INJECTION
Status: DISCONTINUED
Start: 2018-03-15 | End: 2018-03-15

## 2018-03-16 ENCOUNTER — OFFICE VISIT (OUTPATIENT)
Dept: HEMATOLOGY/ONCOLOGY | Facility: HOSPITAL | Age: 68
End: 2018-03-16
Attending: INTERNAL MEDICINE
Payer: MEDICAID

## 2018-03-16 VITALS
RESPIRATION RATE: 16 BRPM | TEMPERATURE: 98 F | HEART RATE: 99 BPM | DIASTOLIC BLOOD PRESSURE: 63 MMHG | SYSTOLIC BLOOD PRESSURE: 124 MMHG

## 2018-03-16 DIAGNOSIS — G06.2 EPIDURAL ABSCESS: Primary | ICD-10-CM

## 2018-03-16 PROCEDURE — A4216 STERILE WATER/SALINE, 10 ML: HCPCS

## 2018-03-16 PROCEDURE — A4216 STERILE WATER/SALINE, 10 ML: HCPCS | Performed by: INTERNAL MEDICINE

## 2018-03-16 PROCEDURE — 96374 THER/PROPH/DIAG INJ IV PUSH: CPT

## 2018-03-16 RX ORDER — 0.9 % SODIUM CHLORIDE 0.9 %
VIAL (ML) INJECTION
Status: DISCONTINUED
Start: 2018-03-16 | End: 2018-03-16

## 2018-03-16 NOTE — PROGRESS NOTES
Eladia Huff presents for daily daptomycin ordered for epidural abscess. picc dressing dry and intact, flushed with 10ml ns with positive blood return noted. Daptomycin infused in 4 minutes with no s/s of adverse reaction noted.  picc flushed with 10ml ns, cap

## 2018-03-17 ENCOUNTER — OFFICE VISIT (OUTPATIENT)
Dept: HEMATOLOGY/ONCOLOGY | Facility: HOSPITAL | Age: 68
End: 2018-03-17
Attending: INTERNAL MEDICINE
Payer: MEDICAID

## 2018-03-17 VITALS
HEART RATE: 106 BPM | RESPIRATION RATE: 18 BRPM | DIASTOLIC BLOOD PRESSURE: 75 MMHG | SYSTOLIC BLOOD PRESSURE: 141 MMHG | TEMPERATURE: 98 F

## 2018-03-17 DIAGNOSIS — G06.2 EPIDURAL ABSCESS: Primary | ICD-10-CM

## 2018-03-17 PROCEDURE — 96374 THER/PROPH/DIAG INJ IV PUSH: CPT

## 2018-03-17 PROCEDURE — A4216 STERILE WATER/SALINE, 10 ML: HCPCS

## 2018-03-17 PROCEDURE — A4216 STERILE WATER/SALINE, 10 ML: HCPCS | Performed by: INTERNAL MEDICINE

## 2018-03-17 RX ORDER — 0.9 % SODIUM CHLORIDE 0.9 %
VIAL (ML) INJECTION
Status: DISCONTINUED
Start: 2018-03-17 | End: 2018-03-17

## 2018-03-18 ENCOUNTER — OFFICE VISIT (OUTPATIENT)
Dept: HEMATOLOGY/ONCOLOGY | Facility: HOSPITAL | Age: 68
End: 2018-03-18
Attending: INTERNAL MEDICINE
Payer: MEDICAID

## 2018-03-18 VITALS
RESPIRATION RATE: 16 BRPM | TEMPERATURE: 98 F | DIASTOLIC BLOOD PRESSURE: 62 MMHG | HEART RATE: 101 BPM | SYSTOLIC BLOOD PRESSURE: 116 MMHG

## 2018-03-18 DIAGNOSIS — G06.2 EPIDURAL ABSCESS: Primary | ICD-10-CM

## 2018-03-18 PROCEDURE — 96374 THER/PROPH/DIAG INJ IV PUSH: CPT

## 2018-03-18 PROCEDURE — A4216 STERILE WATER/SALINE, 10 ML: HCPCS | Performed by: INTERNAL MEDICINE

## 2018-03-18 NOTE — PROGRESS NOTES
Patient arrives for daily antibiotics for epidural abcess. Patient with daughter, ambulating with walker with slow but steady and strong gait. Patient reports he is feeling well, states he is fatigued. Appetite excellent.     PICC line present to right upp

## 2018-03-19 ENCOUNTER — OFFICE VISIT (OUTPATIENT)
Dept: HEMATOLOGY/ONCOLOGY | Facility: HOSPITAL | Age: 68
End: 2018-03-19
Attending: INTERNAL MEDICINE
Payer: MEDICAID

## 2018-03-19 VITALS
SYSTOLIC BLOOD PRESSURE: 138 MMHG | RESPIRATION RATE: 16 BRPM | TEMPERATURE: 98 F | HEART RATE: 105 BPM | DIASTOLIC BLOOD PRESSURE: 62 MMHG

## 2018-03-19 DIAGNOSIS — G06.2 EPIDURAL ABSCESS: Primary | ICD-10-CM

## 2018-03-19 LAB
ALBUMIN SERPL BCP-MCNC: 2.6 G/DL (ref 3.5–4.8)
ALBUMIN/GLOB SERPL: 0.6 {RATIO} (ref 1–2)
ALP SERPL-CCNC: 74 U/L (ref 32–100)
ALT SERPL-CCNC: 49 U/L (ref 17–63)
ANION GAP SERPL CALC-SCNC: 9 MMOL/L (ref 0–18)
AST SERPL-CCNC: 54 U/L (ref 15–41)
BASOPHILS # BLD: 0.2 K/UL (ref 0–0.2)
BASOPHILS NFR BLD: 1 %
BILIRUB SERPL-MCNC: 0.3 MG/DL (ref 0.3–1.2)
BUN SERPL-MCNC: 13 MG/DL (ref 8–20)
BUN/CREAT SERPL: 19.4 (ref 10–20)
CALCIUM SERPL-MCNC: 8.2 MG/DL (ref 8.5–10.5)
CHLORIDE SERPL-SCNC: 96 MMOL/L (ref 95–110)
CK SERPL-CCNC: 714 U/L (ref 49–397)
CO2 SERPL-SCNC: 27 MMOL/L (ref 22–32)
CREAT SERPL-MCNC: 0.67 MG/DL (ref 0.5–1.5)
CRP SERPL-MCNC: 6.5 MG/DL (ref 0–0.9)
EOSINOPHIL # BLD: 0.3 K/UL (ref 0–0.7)
EOSINOPHIL NFR BLD: 3 %
ERYTHROCYTE [DISTWIDTH] IN BLOOD BY AUTOMATED COUNT: 14.4 % (ref 11–15)
ERYTHROCYTE [SEDIMENTATION RATE] IN BLOOD: 83 MM/HR (ref 0–20)
GLOBULIN PLAS-MCNC: 4.4 G/DL (ref 2.5–3.7)
GLUCOSE SERPL-MCNC: 124 MG/DL (ref 70–99)
HCT VFR BLD AUTO: 32.7 % (ref 41–52)
HGB BLD-MCNC: 10.5 G/DL (ref 13.5–17.5)
LYMPHOCYTES # BLD: 1.3 K/UL (ref 1–4)
LYMPHOCYTES NFR BLD: 10 %
MCH RBC QN AUTO: 25.7 PG (ref 27–32)
MCHC RBC AUTO-ENTMCNC: 32 G/DL (ref 32–37)
MCV RBC AUTO: 80.1 FL (ref 80–100)
MONOCYTES # BLD: 0.9 K/UL (ref 0–1)
MONOCYTES NFR BLD: 7 %
NEUTROPHILS # BLD AUTO: 9.8 K/UL (ref 1.8–7.7)
NEUTROPHILS NFR BLD: 79 %
OSMOLALITY UR CALC.SUM OF ELEC: 276 MOSM/KG (ref 275–295)
PATIENT FASTING: NO
PLATELET # BLD AUTO: 316 K/UL (ref 140–400)
PMV BLD AUTO: 7.5 FL (ref 7.4–10.3)
POTASSIUM SERPL-SCNC: 3.9 MMOL/L (ref 3.3–5.1)
PROT SERPL-MCNC: 7 G/DL (ref 5.9–8.4)
RBC # BLD AUTO: 4.08 M/UL (ref 4.5–5.9)
SODIUM SERPL-SCNC: 132 MMOL/L (ref 136–144)
WBC # BLD AUTO: 12.5 K/UL (ref 4–11)

## 2018-03-19 PROCEDURE — A4216 STERILE WATER/SALINE, 10 ML: HCPCS | Performed by: INTERNAL MEDICINE

## 2018-03-19 PROCEDURE — A4216 STERILE WATER/SALINE, 10 ML: HCPCS

## 2018-03-19 PROCEDURE — 82550 ASSAY OF CK (CPK): CPT

## 2018-03-19 PROCEDURE — 80053 COMPREHEN METABOLIC PANEL: CPT

## 2018-03-19 PROCEDURE — 85025 COMPLETE CBC W/AUTO DIFF WBC: CPT

## 2018-03-19 PROCEDURE — 86140 C-REACTIVE PROTEIN: CPT

## 2018-03-19 PROCEDURE — 96374 THER/PROPH/DIAG INJ IV PUSH: CPT

## 2018-03-19 PROCEDURE — 85652 RBC SED RATE AUTOMATED: CPT

## 2018-03-19 RX ORDER — 0.9 % SODIUM CHLORIDE 0.9 %
VIAL (ML) INJECTION
Status: DISCONTINUED
Start: 2018-03-19 | End: 2018-03-19

## 2018-03-19 NOTE — PROGRESS NOTES
Sujit to infusion today for Daptomycin for epidural abscess. He arrives alert and via wheelchair, accompanied by his wife. Patient reports he is feeling well, states he is fatigued. Wife states he does sleep and lounge around a lot.  She has to tell him

## 2018-03-20 ENCOUNTER — OFFICE VISIT (OUTPATIENT)
Dept: HEMATOLOGY/ONCOLOGY | Facility: HOSPITAL | Age: 68
End: 2018-03-20
Attending: INTERNAL MEDICINE
Payer: MEDICAID

## 2018-03-20 VITALS
SYSTOLIC BLOOD PRESSURE: 138 MMHG | RESPIRATION RATE: 18 BRPM | TEMPERATURE: 98 F | DIASTOLIC BLOOD PRESSURE: 68 MMHG | HEART RATE: 90 BPM

## 2018-03-20 DIAGNOSIS — G06.2 EPIDURAL ABSCESS: Primary | ICD-10-CM

## 2018-03-20 PROCEDURE — A4216 STERILE WATER/SALINE, 10 ML: HCPCS

## 2018-03-20 PROCEDURE — A4216 STERILE WATER/SALINE, 10 ML: HCPCS | Performed by: INTERNAL MEDICINE

## 2018-03-20 PROCEDURE — 96374 THER/PROPH/DIAG INJ IV PUSH: CPT

## 2018-03-20 RX ORDER — 0.9 % SODIUM CHLORIDE 0.9 %
VIAL (ML) INJECTION
Status: DISCONTINUED
Start: 2018-03-20 | End: 2018-03-20

## 2018-03-20 NOTE — PROGRESS NOTES
Patient arrives for daily antibiotics for epidural abcess. Ambulated from waiting room with steady gait and use of walker.   Patient reports he is feeling well but continues to have some fatigued and pain in his lower back that travels from right to left an

## 2018-03-21 ENCOUNTER — HOSPITAL ENCOUNTER (INPATIENT)
Facility: HOSPITAL | Age: 68
LOS: 5 days | Discharge: NURSING, REHAB OR LONG-TERM CARE | DRG: 314 | End: 2018-03-27
Attending: EMERGENCY MEDICINE | Admitting: INTERNAL MEDICINE
Payer: MEDICARE

## 2018-03-21 ENCOUNTER — TELEPHONE (OUTPATIENT)
Dept: HEMATOLOGY/ONCOLOGY | Facility: HOSPITAL | Age: 68
End: 2018-03-21

## 2018-03-21 DIAGNOSIS — R78.81 BACTEREMIA: Primary | ICD-10-CM

## 2018-03-21 DIAGNOSIS — G06.2 EPIDURAL ABSCESS: ICD-10-CM

## 2018-03-21 PROCEDURE — 80053 COMPREHEN METABOLIC PANEL: CPT | Performed by: EMERGENCY MEDICINE

## 2018-03-21 PROCEDURE — 87070 CULTURE OTHR SPECIMN AEROBIC: CPT | Performed by: EMERGENCY MEDICINE

## 2018-03-21 PROCEDURE — 87077 CULTURE AEROBIC IDENTIFY: CPT | Performed by: EMERGENCY MEDICINE

## 2018-03-21 PROCEDURE — 36415 COLL VENOUS BLD VENIPUNCTURE: CPT

## 2018-03-21 PROCEDURE — 87186 SC STD MICRODIL/AGAR DIL: CPT | Performed by: EMERGENCY MEDICINE

## 2018-03-21 PROCEDURE — 81001 URINALYSIS AUTO W/SCOPE: CPT | Performed by: EMERGENCY MEDICINE

## 2018-03-21 PROCEDURE — 99285 EMERGENCY DEPT VISIT HI MDM: CPT

## 2018-03-21 PROCEDURE — 85025 COMPLETE CBC W/AUTO DIFF WBC: CPT | Performed by: EMERGENCY MEDICINE

## 2018-03-21 PROCEDURE — 87040 BLOOD CULTURE FOR BACTERIA: CPT | Performed by: EMERGENCY MEDICINE

## 2018-03-21 PROCEDURE — 87147 CULTURE TYPE IMMUNOLOGIC: CPT | Performed by: EMERGENCY MEDICINE

## 2018-03-21 PROCEDURE — 83605 ASSAY OF LACTIC ACID: CPT | Performed by: EMERGENCY MEDICINE

## 2018-03-21 PROCEDURE — 96374 THER/PROPH/DIAG INJ IV PUSH: CPT

## 2018-03-21 RX ORDER — HEPARIN SODIUM 5000 [USP'U]/ML
5000 INJECTION, SOLUTION INTRAVENOUS; SUBCUTANEOUS EVERY 12 HOURS SCHEDULED
Status: DISCONTINUED | OUTPATIENT
Start: 2018-03-21 | End: 2018-03-21

## 2018-03-21 RX ORDER — LISINOPRIL 10 MG/1
10 TABLET ORAL DAILY
Status: DISCONTINUED | OUTPATIENT
Start: 2018-03-21 | End: 2018-03-27

## 2018-03-21 RX ORDER — CYCLOBENZAPRINE HCL 10 MG
10 TABLET ORAL NIGHTLY PRN
Status: DISCONTINUED | OUTPATIENT
Start: 2018-03-21 | End: 2018-03-27

## 2018-03-21 RX ORDER — HYDROCODONE BITARTRATE AND ACETAMINOPHEN 5; 325 MG/1; MG/1
1-2 TABLET ORAL
Status: DISCONTINUED | OUTPATIENT
Start: 2018-03-21 | End: 2018-03-23

## 2018-03-21 RX ORDER — HEPARIN SODIUM 5000 [USP'U]/ML
5000 INJECTION, SOLUTION INTRAVENOUS; SUBCUTANEOUS EVERY 12 HOURS SCHEDULED
Status: DISCONTINUED | OUTPATIENT
Start: 2018-03-22 | End: 2018-03-27

## 2018-03-21 RX ORDER — ONDANSETRON 2 MG/ML
4 INJECTION INTRAMUSCULAR; INTRAVENOUS EVERY 6 HOURS PRN
Status: DISCONTINUED | OUTPATIENT
Start: 2018-03-21 | End: 2018-03-27

## 2018-03-21 RX ORDER — ACETAMINOPHEN 325 MG/1
650 TABLET ORAL EVERY 6 HOURS PRN
Status: DISCONTINUED | OUTPATIENT
Start: 2018-03-21 | End: 2018-03-27

## 2018-03-21 NOTE — CONSULTS
[facility] ID CONSULT NOTE    Abhi Dhaliwal Patient Status:  Emergency    1950 MRN [id number]   Location [address] Attending Alvarez Rick MD   Hosp Day # 0 PCP ARNAUD RYAN Intravenous, Once    Review of Systems:  CONSTITUTIONAL:  No weight loss, weakness or fatigue. HEENT:  Eyes:  No visual loss, blurred vision, double vision or yellow sclerae.  Ears, Nose, Throat:  No hearing loss, sneezing, congestion, runny nose or sore t recent hospitalization 2/23/18-3/2/18 with MSSA bacteremia 2/2 L3/4 epidural abscess with discitis/OM s/p L3-L4 laminectomy 2/27, cx with MSSA. Workup included TTE and WALDEMAR, negative for vegetations.  Originally on IV cefazolin, discharged on daily daptomyci

## 2018-03-21 NOTE — ED NOTES
Pt safe to transport to floor per MD. Emmy Ledesma tolerated well. Pt able to feed self after set up. Report given to Naomi Gonsalez RN on floor.

## 2018-03-21 NOTE — TELEPHONE ENCOUNTER
Zeinab Chang with the doctor office called and said yesterday the patient had some blood culture and after he started shaking very badly.  The doctor is sending him to the ER, so they are canceling  The appointment for today and if he is admitted then they will sen

## 2018-03-21 NOTE — ED PROVIDER NOTES
Patient Seen in: [facility] AND [facility] Emergency Department    History   Patient presents with:  Abnormal Result (metabolic, cardiac)    Stated Complaint: picc line infection    HPI    Patient presents to the emergency department on the advice of his infecti breath  Gastrointestinal: no abdominal pain, no nausea, no vomiting  Genitourinary: no dysuria      Positive for stated complaint: picc line infection  Other systems are as noted in HPI. Constitutional and vital signs reviewed.       All other systems revi primary care physician for admission we did discuss the patient. He accepted patient for observation admission.     ED Course     Labs Reviewed   COMP METABOLIC PANEL (14) - Abnormal; Notable for the following:        Result Value    Glucose 113 (*)     So abscess    Disposition:  Admit    Follow-up:  No follow-up provider specified.     Medications Prescribed:  Current Discharge Medication List        Present on Admission           ICD-10-CM Noted POA    Bacteremia R78.81 3/21/2018 Unknown

## 2018-03-21 NOTE — ED INITIAL ASSESSMENT (HPI)
Pt has a picc for antibiotic therapy every day at infusion center. Pt has an abscess on his back.  Yesterday went to ID and they did blood cultures in the office, called today for being positive

## 2018-03-22 ENCOUNTER — APPOINTMENT (OUTPATIENT)
Dept: HEMATOLOGY/ONCOLOGY | Facility: HOSPITAL | Age: 68
End: 2018-03-22
Attending: INTERNAL MEDICINE
Payer: MEDICAID

## 2018-03-22 ENCOUNTER — APPOINTMENT (OUTPATIENT)
Dept: CV DIAGNOSTICS | Facility: HOSPITAL | Age: 68
DRG: 314 | End: 2018-03-22
Attending: INTERNAL MEDICINE
Payer: MEDICARE

## 2018-03-22 PROCEDURE — 80053 COMPREHEN METABOLIC PANEL: CPT | Performed by: INTERNAL MEDICINE

## 2018-03-22 PROCEDURE — 87040 BLOOD CULTURE FOR BACTERIA: CPT | Performed by: INTERNAL MEDICINE

## 2018-03-22 PROCEDURE — 85025 COMPLETE CBC W/AUTO DIFF WBC: CPT | Performed by: INTERNAL MEDICINE

## 2018-03-22 PROCEDURE — 93306 TTE W/DOPPLER COMPLETE: CPT | Performed by: INTERNAL MEDICINE

## 2018-03-22 PROCEDURE — A4216 STERILE WATER/SALINE, 10 ML: HCPCS

## 2018-03-22 RX ORDER — 0.9 % SODIUM CHLORIDE 0.9 %
VIAL (ML) INJECTION
Status: COMPLETED
Start: 2018-03-22 | End: 2018-03-22

## 2018-03-22 NOTE — PROGRESS NOTES
[facility] ID PROGRESS NOTE    Mary Kay Rm Patient Status:  Observation    1950 MRN [id number]   Location [facility]5W Attending Dmitriy Dee, *   Hosp Day # 0 PCP Jenifer Rodriguez MD     Subjective:  Awake, slept okay.  Af Per patient and patient's wife, patient developed chills after his weekly blood draws from PICC line, last blood draw 3/19, wbc 12.5, , ESR 83, CRP 6.5. ID consulted. Patient afebrile with no complaints. No further episodes of chills since yesterday.

## 2018-03-22 NOTE — HISTORICAL OFFICE NOTE
Emerson Draper  : 1950  ACCOUNT:  [id number]  [phone]  PCP: Dr. Fabricio Beauchamp     TODAY'S DATE: 2018  DICTATED BY:  [Dr. Chirag Kenny]      CHIEF COMPLAINT: [Followup of Hypercholesteremia, pure and Followup of Hypertension.] and rhythm, clear to auscultation. GI: no masses, tenderness or hepatosplenomegaly, rectal deferred. MS: adequate gait for exercise/testing. EXT: no clubbing or cyanosis. SKIN: no rashes, lesions, ulcers.   NEURO/PSYCH: alert and oriented to time, place an

## 2018-03-22 NOTE — PROGRESS NOTES
[facility] Dosing Service  Antibiotic Dosing    Kiera Lopez is a 69year old male for whom pharmacy is dosing cefepime for treatment of Cath tip culture + for Enterobacter. Allergies: has No Known Allergies.     Vitals: /40 (BP Location

## 2018-03-22 NOTE — CONSULTS
[facility] HOSP - [facility]    Cardiology Consultation    Abhi Dhaliwal Patient Status:  Inpatient     MRN [id number]   Location [facility] Attending Irma Foster, *   Hosp Day # 0 PCP Luci Wilson MD     3/21 Daily  •  Heparin Sodium (Porcine) 5000 UNIT/ML injection 5,000 Units, 5,000 Units, Subcutaneous, 2 times per day    Facility-Administered Medications Ordered in Other Encounters:   •  DAPTOmycin (CUBICIN) 525 mg in Sterile Water for Injection IV syringe, 10 03/22/2018    03/22/2018   K 4.1 03/22/2018   CL 98 03/22/2018   CO2 27 03/22/2018    03/22/2018   CA 8.6 03/22/2018   ALB 2.5 03/22/2018   ALKPHO 62 03/22/2018   BILT 0.4 03/22/2018   TP 6.9 03/22/2018   AST 29 03/22/2018   ALT 38 03/22/20

## 2018-03-22 NOTE — PLAN OF CARE
Problem: Patient/Family Goals  Goal: Patient/Family Long Term Goal  Patient's Long Term Goal: go home    Interventions:   Follow MD orders  Assess for signs of infection  - See additional Care Plan goals for specific interventions    Outcome: Progressing based on assessment  - Modify environment to reduce risk of injury  - Provide assistive devices as appropriate  - Consider OT/PT consult to assist with strengthening/mobility  - Encourage toileting schedule   Outcome: Progressing      Problem: Patient Cent

## 2018-03-23 ENCOUNTER — APPOINTMENT (OUTPATIENT)
Dept: INTERVENTIONAL RADIOLOGY/VASCULAR | Facility: HOSPITAL | Age: 68
DRG: 314 | End: 2018-03-23
Attending: NEUROLOGICAL SURGERY
Payer: MEDICARE

## 2018-03-23 ENCOUNTER — APPOINTMENT (OUTPATIENT)
Dept: MRI IMAGING | Facility: HOSPITAL | Age: 68
DRG: 314 | End: 2018-03-23
Attending: INTERNAL MEDICINE
Payer: MEDICARE

## 2018-03-23 ENCOUNTER — PRIOR ORIGINAL RECORDS (OUTPATIENT)
Dept: OTHER | Age: 68
End: 2018-03-23

## 2018-03-23 ENCOUNTER — APPOINTMENT (OUTPATIENT)
Dept: HEMATOLOGY/ONCOLOGY | Facility: HOSPITAL | Age: 68
End: 2018-03-23
Attending: INTERNAL MEDICINE
Payer: MEDICAID

## 2018-03-23 PROCEDURE — 87205 SMEAR GRAM STAIN: CPT | Performed by: NEUROLOGICAL SURGERY

## 2018-03-23 PROCEDURE — 0SB23ZX EXCISION OF LUMBAR VERTEBRAL DISC, PERCUTANEOUS APPROACH, DIAGNOSTIC: ICD-10-PCS | Performed by: RADIOLOGY

## 2018-03-23 PROCEDURE — A9575 INJ GADOTERATE MEGLUMI 0.1ML: HCPCS | Performed by: INTERNAL MEDICINE

## 2018-03-23 PROCEDURE — 77003 FLUOROGUIDE FOR SPINE INJECT: CPT

## 2018-03-23 PROCEDURE — 87206 SMEAR FLUORESCENT/ACID STAI: CPT | Performed by: NEUROLOGICAL SURGERY

## 2018-03-23 PROCEDURE — 87147 CULTURE TYPE IMMUNOLOGIC: CPT | Performed by: NEUROLOGICAL SURGERY

## 2018-03-23 PROCEDURE — A4216 STERILE WATER/SALINE, 10 ML: HCPCS

## 2018-03-23 PROCEDURE — 10160 PNXR ASPIR ABSC HMTMA BULLA: CPT

## 2018-03-23 PROCEDURE — 87116 MYCOBACTERIA CULTURE: CPT | Performed by: NEUROLOGICAL SURGERY

## 2018-03-23 PROCEDURE — 87070 CULTURE OTHR SPECIMN AEROBIC: CPT | Performed by: NEUROLOGICAL SURGERY

## 2018-03-23 PROCEDURE — 85610 PROTHROMBIN TIME: CPT | Performed by: CLINICAL NURSE SPECIALIST

## 2018-03-23 PROCEDURE — 77002 NEEDLE LOCALIZATION BY XRAY: CPT

## 2018-03-23 PROCEDURE — 87186 SC STD MICRODIL/AGAR DIL: CPT | Performed by: NEUROLOGICAL SURGERY

## 2018-03-23 PROCEDURE — 87075 CULTR BACTERIA EXCEPT BLOOD: CPT | Performed by: NEUROLOGICAL SURGERY

## 2018-03-23 PROCEDURE — 62267 INTERDISCAL PERQ ASPIR DX: CPT

## 2018-03-23 PROCEDURE — 72158 MRI LUMBAR SPINE W/O & W/DYE: CPT | Performed by: INTERNAL MEDICINE

## 2018-03-23 PROCEDURE — 99153 MOD SED SAME PHYS/QHP EA: CPT

## 2018-03-23 PROCEDURE — 87102 FUNGUS ISOLATION CULTURE: CPT | Performed by: NEUROLOGICAL SURGERY

## 2018-03-23 PROCEDURE — 99152 MOD SED SAME PHYS/QHP 5/>YRS: CPT

## 2018-03-23 RX ORDER — LIDOCAINE HYDROCHLORIDE 20 MG/ML
INJECTION, SOLUTION EPIDURAL; INFILTRATION; INTRACAUDAL; PERINEURAL
Status: COMPLETED
Start: 2018-03-23 | End: 2018-03-23

## 2018-03-23 RX ORDER — SODIUM CHLORIDE 9 MG/ML
INJECTION, SOLUTION INTRAVENOUS
Status: DISPENSED
Start: 2018-03-23 | End: 2018-03-24

## 2018-03-23 RX ORDER — MIDAZOLAM HYDROCHLORIDE 1 MG/ML
INJECTION INTRAMUSCULAR; INTRAVENOUS
Status: COMPLETED
Start: 2018-03-23 | End: 2018-03-23

## 2018-03-23 RX ORDER — HYDROCODONE BITARTRATE AND ACETAMINOPHEN 10; 325 MG/1; MG/1
1 TABLET ORAL 4 TIMES DAILY PRN
Status: DISCONTINUED | OUTPATIENT
Start: 2018-03-23 | End: 2018-03-27

## 2018-03-23 RX ORDER — SODIUM CHLORIDE 9 MG/ML
INJECTION, SOLUTION INTRAVENOUS
Status: COMPLETED
Start: 2018-03-23 | End: 2018-03-23

## 2018-03-23 RX ORDER — MORPHINE SULFATE 2 MG/ML
1 INJECTION, SOLUTION INTRAMUSCULAR; INTRAVENOUS EVERY 6 HOURS PRN
Status: DISCONTINUED | OUTPATIENT
Start: 2018-03-23 | End: 2018-03-27

## 2018-03-23 RX ORDER — 0.9 % SODIUM CHLORIDE 0.9 %
VIAL (ML) INJECTION
Status: DISPENSED
Start: 2018-03-23 | End: 2018-03-23

## 2018-03-23 RX ORDER — 0.9 % SODIUM CHLORIDE 0.9 %
VIAL (ML) INJECTION
Status: COMPLETED
Start: 2018-03-23 | End: 2018-03-23

## 2018-03-23 NOTE — PROGRESS NOTES
Aleida Adhikari  [id number]      Post procedure/ recovery hand-off report given to Rosalia LAWSON. Patient's vital signs stable, access site dry and intact.      Leena Kent RN

## 2018-03-23 NOTE — PROGRESS NOTES
[facility]    Cardiology Progress Note    Marquis Mahmood Patient Status:  Inpatient    1950 MRN [id number]   Location [facility] Attending Haim Mahoney, *   Hosp Day # 1 PCP Diamond Ortiz MD     3/2 Facility-Administered Medications:  Sodium Chloride 0.9 % solution      Cefepime HCl (MAXIPIME) 1 g in sodium chloride 0.9% 100 mL MBP/add-vantage 1 g Intravenous Q8H   ondansetron HCl (ZOFRAN) injection 4 mg 4 mg Intravenous Q6H PRN   acetaminophen (TYLEN

## 2018-03-23 NOTE — CM/SW NOTE
SW gave pt and pt's wife Medicare Part B application, nearest social security office location near their home and step by step information regarding Medicare Part B. SW/CM to remain available for support and/or discharge planning.      ANA MARÍA Ribeiro x1

## 2018-03-23 NOTE — PROCEDURES
[facility]  Procedure Note    Irena [place] Patient Status:  Inpatient    1950 MRN [id number]   Location [facility] Attending Lesley Blanco, *   Hosp Day # 1 PCP Oumou Rodriguez MD

## 2018-03-23 NOTE — PLAN OF CARE
Elsa Valencia  [id number]  8/18/1950    IR MD/ APN Pre-Procedure Plan  (Inpatients Only)    Date of IR Procedure: 3/23/2018  Procedure to be performed: L3 disk aspiration  Laterality: n/a  Room Modality: X-Ray  Urgency (STAT/ Today/ Routine):  Today

## 2018-03-23 NOTE — PROGRESS NOTES
[facility]    Progress Note    Nelda Bob Patient Status:  Inpatient    1950 MRN [id number]   Location [facility] Attending Frankie Santos, [facility] Day # 1 PCP Nu Maharaj MD     Subjective: 98 03/22/2018   CO2 27 03/22/2018    (H) 03/22/2018   CA 8.6 03/22/2018   ALB 2.5 (L) 03/22/2018   ALKPHO 62 03/22/2018   BILT 0.4 03/22/2018   TP 6.9 03/22/2018   AST 29 03/22/2018   ALT 38 03/22/2018   PSA 1.4 05/09/2017   ESRML 83 (H) 03/19/2018

## 2018-03-23 NOTE — PROGRESS NOTES
[facility] ID PROGRESS NOTE    Erickson Corona Patient Status:  Observation    1950 MRN [id number]   Location [facility]5W Attending Micheline Koyanagi, *   Hosp Day # 1 PCP Sanjuana Penn MD     Subjective:  Awake, slept okay.  Af weekly blood draws from PICC line, last blood draw 3/19, wbc 12.5, , ESR 83, CRP 6.5. ID consulted. Patient afebrile with no complaints.  No further episodes of chills since yesterday.      # Enterobacter cloacae bacteremia 2/2 3/20 2/2 PICC source, t

## 2018-03-23 NOTE — H&P
[facility]    PATIENT'S NAME: Swapnil Craig   ATTENDING PHYSICIAN: Nelly Barrios MD   PATIENT ACCOUNT#:   [id number]    LOCATION:  [address] [place] [address] RECORD #:   [id number]       YOB: 1950  ADMISSION DATE: gram-negative, from a PICC line. We will discontinue the PICC line. We will give him antibiotics for about 10 days. We will look at the possibility of __________, although it is extremely unlikely.   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

## 2018-03-23 NOTE — PRE-SEDATION ASSESSMENT
IR Pre-Procedure Sedation Assessment    History of snoring or sleep or apnea?    Yes    History of previous problems with anesthesia or sedation  No    Physical Findings:  Neck: nl ROM  CV: RRR  PULM: normal respiratory rate/effort    Mallampati Score:  II

## 2018-03-23 NOTE — PROGRESS NOTES
[facility] dosing service    Initial Pharmacokinetic Consult for Vancomycin Dosing     Nicolette Humphreys is a 69year old male  who is being treated for bacteremia.   Pharmacy has been asked to dose Vancomycin by Dr. Harden Em    He has No Known Allergi followed by 1.25 gm Q 12 hours  based upon, 90.4kg weight, renal function, and pharmacokinetics. 2.  Pharmacy ordered Vancomycin trough level(s) prior to 4th dose. Goal trough level 15-20 ug/mL.     3.  Pharmacy will need BUN/Scr daily while on Vancomy

## 2018-03-24 ENCOUNTER — APPOINTMENT (OUTPATIENT)
Dept: HEMATOLOGY/ONCOLOGY | Facility: HOSPITAL | Age: 68
End: 2018-03-24
Attending: INTERNAL MEDICINE
Payer: MEDICAID

## 2018-03-24 PROCEDURE — A4216 STERILE WATER/SALINE, 10 ML: HCPCS

## 2018-03-24 PROCEDURE — 82550 ASSAY OF CK (CPK): CPT | Performed by: INTERNAL MEDICINE

## 2018-03-24 RX ORDER — 0.9 % SODIUM CHLORIDE 0.9 %
VIAL (ML) INJECTION
Status: COMPLETED
Start: 2018-03-24 | End: 2018-03-24

## 2018-03-24 NOTE — PROGRESS NOTES
[facility] ID PROGRESS NOTE    [facility] Patient Status:  Observation    1950 MRN [id number]   Location [facility]W Attending Zack Powell, *   Hosp Day # 2 PCP Oumou Florence MD     Subjective:  Awake, s/p  L3/4 disc developed chills after his weekly blood draws from PICC line, last blood draw 3/19, wbc 12.5, , ESR 83, CRP 6.5. ID consulted. Patient afebrile with no complaints.  No further episodes of chills since yesterday.      # Enterobacter cloacae bacteremia

## 2018-03-24 NOTE — PROGRESS NOTES
[facility]    Progress Note    Mitali Law Patient Status:  Inpatient    1950 MRN [id number]   Location [facility] Attending Rosa Quintana, [facility] Day # 2 PCP Teresa Chandra MD     Subjective: CO2 27 03/22/2018    (H) 03/22/2018   CA 8.6 03/22/2018   ALB 2.5 (L) 03/22/2018   ALKPHO 62 03/22/2018   BILT 0.4 03/22/2018   TP 6.9 03/22/2018   AST 29 03/22/2018   ALT 38 03/22/2018   INR 1.3 (H) 03/23/2018   PSA 1.4 05/09/2017   ESRML 83 (H) MD MARTHA  3/24/2018

## 2018-03-24 NOTE — PROGRESS NOTES
NEUROSURGERY    S:  Patient is a 68 yo old male known to our practice as Dr. Isis Madrid performed an L3-4 laminectomy on 2/27/18 for abscess consistent with MSSA.    Patient was subsequently under the care of ID getting IV antibiotic therapy through PICC line whi neural foraminal narrowing. 4.  Abnormal marrow signal within the L3 and L4 vertebrae, suggesting osteomyelitis. These changes have progressed since prior exam.  There is grossly stable appearance of fluid in the L3-4 disk compatible with discitis.   5.

## 2018-03-25 ENCOUNTER — APPOINTMENT (OUTPATIENT)
Dept: HEMATOLOGY/ONCOLOGY | Facility: HOSPITAL | Age: 68
End: 2018-03-25
Attending: INTERNAL MEDICINE
Payer: MEDICAID

## 2018-03-25 PROCEDURE — A4216 STERILE WATER/SALINE, 10 ML: HCPCS

## 2018-03-25 RX ORDER — 0.9 % SODIUM CHLORIDE 0.9 %
VIAL (ML) INJECTION
Status: COMPLETED
Start: 2018-03-25 | End: 2018-03-25

## 2018-03-25 NOTE — PROGRESS NOTES
NEUROSURGERY    S  Patient complains of mild pain in the low back. Denies any radiation of pain down into the extremities and denies any weakness or paresthesias. Denies bowel/bladder incontinence.     O:     03/25/18  0655   BP:    Pulse:    Resp:    Tem

## 2018-03-25 NOTE — PLAN OF CARE
Problem: Patient/Family Goals  Goal: Patient/Family Long Term Goal  Patient's Long Term Goal: go home    Interventions:   Follow MD orders  Assess for signs of infection  - See additional Care Plan goals for specific interventions    Outcome: Progressing Progressing      Problem: Patient Centered Care  Goal: Patient preferences are identified and integrated in the patient's plan of care  Interventions:  - What would you like us to know as we care for you?  Call my wife if you need any information  - Provide

## 2018-03-25 NOTE — PROGRESS NOTES
[facility]    Progress Note    Scott Cadena Patient Status:  Inpatient    1950 MRN [id number]   Location [address] [facility] Attending Saumya Campbell, [address] Day # 3 PCP Ciaran Vargas MD     Subjective: ALB 2.5 (L) 03/22/2018   ALKPHO 62 03/22/2018   BILT 0.4 03/22/2018   TP 6.9 03/22/2018   AST 29 03/22/2018   ALT 38 03/22/2018   INR 1.3 (H) 03/23/2018   PSA 1.4 05/09/2017   ESRML 83 (H) 03/19/2018   CRP 6.5 (H) 03/19/2018   CK 51 03/24/2018       Mri

## 2018-03-26 ENCOUNTER — APPOINTMENT (OUTPATIENT)
Dept: HEMATOLOGY/ONCOLOGY | Facility: HOSPITAL | Age: 68
End: 2018-03-26
Attending: INTERNAL MEDICINE
Payer: MEDICAID

## 2018-03-26 PROCEDURE — 62267 INTERDISCAL PERQ ASPIR DX: CPT

## 2018-03-26 PROCEDURE — 76937 US GUIDE VASCULAR ACCESS: CPT

## 2018-03-26 PROCEDURE — 36569 INSJ PICC 5 YR+ W/O IMAGING: CPT

## 2018-03-26 PROCEDURE — 99153 MOD SED SAME PHYS/QHP EA: CPT

## 2018-03-26 PROCEDURE — 77003 FLUOROGUIDE FOR SPINE INJECT: CPT

## 2018-03-26 PROCEDURE — 99152 MOD SED SAME PHYS/QHP 5/>YRS: CPT

## 2018-03-26 PROCEDURE — A4216 STERILE WATER/SALINE, 10 ML: HCPCS

## 2018-03-26 RX ORDER — SODIUM CHLORIDE 0.9 % (FLUSH) 0.9 %
10 SYRINGE (ML) INJECTION AS NEEDED
Status: DISCONTINUED | OUTPATIENT
Start: 2018-03-26 | End: 2018-03-27

## 2018-03-26 RX ORDER — LIDOCAINE HYDROCHLORIDE 10 MG/ML
0.5 INJECTION, SOLUTION INFILTRATION; PERINEURAL ONCE AS NEEDED
Status: ACTIVE | OUTPATIENT
Start: 2018-03-26 | End: 2018-03-26

## 2018-03-26 RX ORDER — 0.9 % SODIUM CHLORIDE 0.9 %
VIAL (ML) INJECTION
Status: COMPLETED
Start: 2018-03-26 | End: 2018-03-26

## 2018-03-26 NOTE — PROGRESS NOTES
[facility]    Progress Note    Tyrone Iqbal Patient Status:  Inpatient    1950 MRN [id number]   [facility] 5SW/SE Attending Jolie Dawkins, *   Hosp Day # 4 PCP Dmitriy Bhatti MD     Subject 03/22/2018   CA 8.6 03/22/2018   ALB 2.5 (L) 03/22/2018   ALKPHO 62 03/22/2018   BILT 0.4 03/22/2018   TP 6.9 03/22/2018   AST 29 03/22/2018   ALT 38 03/22/2018   INR 1.3 (H) 03/23/2018   PSA 1.4 05/09/2017   ESRML 83 (H) 03/19/2018   CRP 6.5 (H) 03/19/201

## 2018-03-26 NOTE — CM/SW NOTE
Addend 328p: SW spoke w/ Nafisa Cohen from [facility] [phone], who stated they are able to accept pt and will have a bed available Tuesday 3/27.   ---------------------------------------------------------------------------  Addend 107p: Pt and pt's

## 2018-03-26 NOTE — PROGRESS NOTES
[facility] ID PROGRESS NOTE    [place] Tamir Patient Status:  Observation    1950 MRN [id number]   Location [facility]5W Attending Hanna Cruz, *   Hosp Day # 4 PCP Khushi Arias MD     Subjective:  Awake, afebrile.  Has blood draw 3/19, wbc 12.5, , ESR 83, CRP 6.5. ID consulted. Patient afebrile with no complaints.  No further episodes of chills since yesterday.      # Enterobacter cloacae bacteremia 2/2 3/20 2/2 PICC source, tip cx with Enterobacter cloacae s/p tomasa

## 2018-03-27 ENCOUNTER — APPOINTMENT (OUTPATIENT)
Dept: HEMATOLOGY/ONCOLOGY | Facility: HOSPITAL | Age: 68
End: 2018-03-27
Attending: INTERNAL MEDICINE
Payer: MEDICAID

## 2018-03-27 VITALS
SYSTOLIC BLOOD PRESSURE: 113 MMHG | HEIGHT: 67 IN | HEART RATE: 102 BPM | TEMPERATURE: 99 F | DIASTOLIC BLOOD PRESSURE: 61 MMHG | WEIGHT: 199.31 LBS | RESPIRATION RATE: 18 BRPM | BODY MASS INDEX: 31.28 KG/M2 | OXYGEN SATURATION: 95 %

## 2018-03-27 PROCEDURE — 97535 SELF CARE MNGMENT TRAINING: CPT

## 2018-03-27 PROCEDURE — A4216 STERILE WATER/SALINE, 10 ML: HCPCS | Performed by: PHYSICIAN ASSISTANT

## 2018-03-27 PROCEDURE — 85025 COMPLETE CBC W/AUTO DIFF WBC: CPT | Performed by: PHYSICIAN ASSISTANT

## 2018-03-27 PROCEDURE — 97162 PT EVAL MOD COMPLEX 30 MIN: CPT

## 2018-03-27 PROCEDURE — 97165 OT EVAL LOW COMPLEX 30 MIN: CPT

## 2018-03-27 RX ORDER — HYDROCODONE BITARTRATE AND ACETAMINOPHEN 10; 325 MG/1; MG/1
1 TABLET ORAL EVERY 6 HOURS PRN
Qty: 40 TABLET | Refills: 1 | Status: SHIPPED | OUTPATIENT
Start: 2018-03-27

## 2018-03-27 NOTE — OCCUPATIONAL THERAPY NOTE
OCCUPATIONAL THERAPY EVALUATION - INPATIENT     Room Number: 525/525-A  Evaluation Date: 3/27/2018  Type of Evaluation: Initial  Presenting Problem: gram-negative sepsis, spine surgery    Physician Order: IP Consult to Occupational Therapy  Reason for Th supervised setting  OT Device Recommendations: None    PLAN    The patient is below baseline and would benefit from skilled inpatient OT to address the above deficits, maximizing patient's ability to return to prior level of function.  Following DC anticipa appropriate, friendly    Behavioral/Emotional/Social: appropriate    RANGE OF MOTION   Upper extremity ROM is within functional limits    STRENGTH ASSESSMENT  Upper extremity strength is within functional limits     ACTIVITIES OF DAILY LIVING ASSESSMENT  A Therapist  [facility]   [id number]

## 2018-03-27 NOTE — CM/SW NOTE
SW received call from Dr. Erika Stokes inquiring about pt's discharge plans. [facility] [phone] have accepted pt and will have a bed available for today.      MD inquired about pt's IV-abx and requested SW to confirm that rehab will follow up

## 2018-03-27 NOTE — PLAN OF CARE
Report called and given to Nurse, Lilo Smalls at [facility]. Patient will be transported by wife in private vehicle and taken to rehab facility.  Chart copied and discharge instructions are packed in envelope and will be sent with patient's spous

## 2018-03-27 NOTE — PLAN OF CARE
Problem: Patient/Family Goals  Goal: Patient/Family Long Term Goal  Patient's Long Term Goal: go home    Interventions:   Follow MD orders  Assess for signs of infection  - See additional Care Plan goals for specific interventions    Outcome: Progressing  ( ordered   Outcome: Progressing  (1) Assess for and report changes in neurological status. (2) Vital signs and labs are monitored. (3) Monitor temperature, glucose, and sodium. Initiate appropriate interventions as ordered.     Problem: RISK FOR INFECTION choices   Outcome: Progressing  (1) Patient is provided with patience, privacy, and respect at all times. (2) Patient informed of all procedures, tests, and plan of care for the day.   (3) Patient and family are encouraged to participate in care and decisi

## 2018-03-27 NOTE — PROGRESS NOTES
[facility]    Neurosurgery Progress Note    Tam Mins Patient Status:  Inpatient    1950 MRN [id number]   [facility] 5SW/SE Attending Warden Zhao, [address] Day # 5 PCP [address], M commands  PERRLA  EOMI  MAEx4  Sensation intact   Incision c/d/I    Abdomen:  Soft, non-distended, non-tender, with no rebound or guarding. No peritoneal signs. Extremities:  Non-tender, no lower extremity edema noted.         Imaging:        Assessment/

## 2018-03-27 NOTE — PHYSICAL THERAPY NOTE
PHYSICAL THERAPY EVALUATION - INPATIENT     Room Number: 525/525-A  Evaluation Date: 3/27/2018  Type of Evaluation: Initial   Physician Order: PT Eval and Treat    Presenting Problem: bacteremia  sepsis    hx of epidural abscess requiring PMH of laminecto precautions , handouts provided, pt could use review and reinforcement of education. NO family present this session for training or d/c planning. Pt able to amb with  ft , cues for posture and use of AD provided. Pt with IV line in place . clinic yesterday, developed shaking chills when leaving the office, blood cx obtained at [facility] office, positive for GNR. Per patient and patient's wife, patient developed chills after his weekly blood draws from PICC line. , ESR 83, CRP 6.5.  ID con precautions  SUBJECTIVE  Goal for rehab facility  Concern about need for IV antibiotics at d/c     Pain well controlled     PHYSICAL THERAPY EXAMINATION     OBJECTIVE  Precautions: Spine  Fall Risk: Standard fall risk    WEIGHT BEARING RESTRICTION  Weight Impairment Score: 41.77%   Standardized Score (AM-PAC Scale): 45.44   CMS Modifier (G-Code): CK    FUNCTIONAL ABILITY STATUS  Gait Assessment   Gait Assistance: Supervision  Distance (ft): 400 ft  Assistive Device: Rolling walker  Pattern:  (cues for postu

## 2018-03-27 NOTE — PROGRESS NOTES
[facility] ID PROGRESS NOTE    Kalya Miller Patient Status:  Observation    1950 MRN [id number]   Location [facility]5W Attending Jessica Baron, *   Hosp Day # 5 PCP Lakeisha Luevano MD     Subjective:  Awake, afebrile.  Havi chills after his weekly blood draws from PICC line, last blood draw 3/19, wbc 12.5, , ESR 83, CRP 6.5. ID consulted. Patient afebrile with no complaints.  No further episodes of chills since yesterday.      # Enterobacter cloacae bacteremia 2/2 3/20 2

## 2018-03-28 ENCOUNTER — APPOINTMENT (OUTPATIENT)
Dept: HEMATOLOGY/ONCOLOGY | Facility: HOSPITAL | Age: 68
End: 2018-03-28
Attending: INTERNAL MEDICINE
Payer: MEDICAID

## 2018-03-28 NOTE — DISCHARGE SUMMARY
[facility]    PATIENT'S NAME: Corrie Sushil   ATTENDING PHYSICIAN: Daniel Cisneros MD   PATIENT ACCOUNT#:   [id number]    LOCATION:  [address] [place] [address] Way RECORD #:   [id number]       YOB: 1950  ADMISSION DATE:

## 2018-03-29 ENCOUNTER — APPOINTMENT (OUTPATIENT)
Dept: HEMATOLOGY/ONCOLOGY | Facility: HOSPITAL | Age: 68
End: 2018-03-29
Attending: INTERNAL MEDICINE
Payer: MEDICAID

## 2018-03-30 ENCOUNTER — APPOINTMENT (OUTPATIENT)
Dept: HEMATOLOGY/ONCOLOGY | Facility: HOSPITAL | Age: 68
End: 2018-03-30
Attending: INTERNAL MEDICINE
Payer: MEDICAID

## 2018-03-31 ENCOUNTER — APPOINTMENT (OUTPATIENT)
Dept: HEMATOLOGY/ONCOLOGY | Facility: HOSPITAL | Age: 68
End: 2018-03-31
Attending: INTERNAL MEDICINE
Payer: MEDICAID

## 2018-04-01 ENCOUNTER — APPOINTMENT (OUTPATIENT)
Dept: HEMATOLOGY/ONCOLOGY | Facility: HOSPITAL | Age: 68
End: 2018-04-01
Attending: INTERNAL MEDICINE

## 2018-04-02 ENCOUNTER — APPOINTMENT (OUTPATIENT)
Dept: HEMATOLOGY/ONCOLOGY | Facility: HOSPITAL | Age: 68
End: 2018-04-02
Attending: INTERNAL MEDICINE

## 2018-04-03 ENCOUNTER — APPOINTMENT (OUTPATIENT)
Dept: HEMATOLOGY/ONCOLOGY | Facility: HOSPITAL | Age: 68
End: 2018-04-03
Attending: INTERNAL MEDICINE

## 2018-04-04 ENCOUNTER — APPOINTMENT (OUTPATIENT)
Dept: HEMATOLOGY/ONCOLOGY | Facility: HOSPITAL | Age: 68
End: 2018-04-04
Attending: INTERNAL MEDICINE

## 2018-04-05 ENCOUNTER — APPOINTMENT (OUTPATIENT)
Dept: HEMATOLOGY/ONCOLOGY | Facility: HOSPITAL | Age: 68
End: 2018-04-05
Attending: INTERNAL MEDICINE

## 2018-04-06 ENCOUNTER — APPOINTMENT (OUTPATIENT)
Dept: HEMATOLOGY/ONCOLOGY | Facility: HOSPITAL | Age: 68
End: 2018-04-06
Attending: INTERNAL MEDICINE

## 2018-04-07 ENCOUNTER — APPOINTMENT (OUTPATIENT)
Dept: HEMATOLOGY/ONCOLOGY | Facility: HOSPITAL | Age: 68
End: 2018-04-07
Attending: INTERNAL MEDICINE

## 2018-04-08 ENCOUNTER — APPOINTMENT (OUTPATIENT)
Dept: HEMATOLOGY/ONCOLOGY | Facility: HOSPITAL | Age: 68
End: 2018-04-08
Attending: INTERNAL MEDICINE

## 2018-04-09 ENCOUNTER — APPOINTMENT (OUTPATIENT)
Dept: HEMATOLOGY/ONCOLOGY | Facility: HOSPITAL | Age: 68
End: 2018-04-09
Attending: INTERNAL MEDICINE

## 2018-04-10 ENCOUNTER — APPOINTMENT (OUTPATIENT)
Dept: HEMATOLOGY/ONCOLOGY | Facility: HOSPITAL | Age: 68
End: 2018-04-10
Attending: INTERNAL MEDICINE

## 2018-04-11 ENCOUNTER — HOSPITAL ENCOUNTER (OUTPATIENT)
Dept: MRI IMAGING | Facility: HOSPITAL | Age: 68
Discharge: HOME | End: 2018-04-11
Attending: INTERNAL MEDICINE
Payer: MEDICARE

## 2018-04-11 ENCOUNTER — APPOINTMENT (OUTPATIENT)
Dept: HEMATOLOGY/ONCOLOGY | Facility: HOSPITAL | Age: 68
End: 2018-04-11
Attending: INTERNAL MEDICINE

## 2018-04-11 DIAGNOSIS — G06.2 EPIDURAL ABSCESS: ICD-10-CM

## 2018-04-11 PROCEDURE — 72158 MRI LUMBAR SPINE W/O & W/DYE: CPT | Performed by: INTERNAL MEDICINE

## 2018-04-11 PROCEDURE — A9575 INJ GADOTERATE MEGLUMI 0.1ML: HCPCS | Performed by: INTERNAL MEDICINE

## 2018-04-12 ENCOUNTER — APPOINTMENT (OUTPATIENT)
Dept: HEMATOLOGY/ONCOLOGY | Facility: HOSPITAL | Age: 68
End: 2018-04-12
Attending: INTERNAL MEDICINE

## 2018-04-13 ENCOUNTER — APPOINTMENT (OUTPATIENT)
Dept: HEMATOLOGY/ONCOLOGY | Facility: HOSPITAL | Age: 68
End: 2018-04-13
Attending: INTERNAL MEDICINE

## 2018-04-14 ENCOUNTER — APPOINTMENT (OUTPATIENT)
Dept: HEMATOLOGY/ONCOLOGY | Facility: HOSPITAL | Age: 68
End: 2018-04-14
Attending: INTERNAL MEDICINE

## 2018-04-15 ENCOUNTER — APPOINTMENT (OUTPATIENT)
Dept: HEMATOLOGY/ONCOLOGY | Facility: HOSPITAL | Age: 68
End: 2018-04-15
Attending: INTERNAL MEDICINE

## 2018-04-16 ENCOUNTER — APPOINTMENT (OUTPATIENT)
Dept: HEMATOLOGY/ONCOLOGY | Facility: HOSPITAL | Age: 68
End: 2018-04-16
Attending: INTERNAL MEDICINE

## 2018-04-17 ENCOUNTER — APPOINTMENT (OUTPATIENT)
Dept: HEMATOLOGY/ONCOLOGY | Facility: HOSPITAL | Age: 68
End: 2018-04-17
Attending: INTERNAL MEDICINE

## 2018-04-18 ENCOUNTER — APPOINTMENT (OUTPATIENT)
Dept: HEMATOLOGY/ONCOLOGY | Facility: HOSPITAL | Age: 68
End: 2018-04-18
Attending: INTERNAL MEDICINE

## 2018-04-19 ENCOUNTER — APPOINTMENT (OUTPATIENT)
Dept: HEMATOLOGY/ONCOLOGY | Facility: HOSPITAL | Age: 68
End: 2018-04-19
Attending: INTERNAL MEDICINE

## 2018-04-20 ENCOUNTER — APPOINTMENT (OUTPATIENT)
Dept: HEMATOLOGY/ONCOLOGY | Facility: HOSPITAL | Age: 68
End: 2018-04-20
Attending: INTERNAL MEDICINE

## 2018-04-21 ENCOUNTER — APPOINTMENT (OUTPATIENT)
Dept: HEMATOLOGY/ONCOLOGY | Facility: HOSPITAL | Age: 68
End: 2018-04-21
Attending: INTERNAL MEDICINE

## 2018-04-22 ENCOUNTER — APPOINTMENT (OUTPATIENT)
Dept: HEMATOLOGY/ONCOLOGY | Facility: HOSPITAL | Age: 68
End: 2018-04-22
Attending: INTERNAL MEDICINE

## 2018-04-23 ENCOUNTER — APPOINTMENT (OUTPATIENT)
Dept: HEMATOLOGY/ONCOLOGY | Facility: HOSPITAL | Age: 68
End: 2018-04-23
Attending: INTERNAL MEDICINE

## 2018-04-24 ENCOUNTER — APPOINTMENT (OUTPATIENT)
Dept: HEMATOLOGY/ONCOLOGY | Facility: HOSPITAL | Age: 68
End: 2018-04-24
Attending: INTERNAL MEDICINE

## 2018-05-01 ENCOUNTER — HOSPITAL ENCOUNTER (OUTPATIENT)
Dept: MRI IMAGING | Facility: HOSPITAL | Age: 68
Discharge: HOME | End: 2018-05-01
Attending: INTERNAL MEDICINE
Payer: MEDICARE

## 2018-05-01 DIAGNOSIS — B96.89: ICD-10-CM

## 2018-05-01 DIAGNOSIS — G06.2: ICD-10-CM

## 2018-05-01 PROCEDURE — A9576 INJ PROHANCE MULTIPACK: HCPCS | Performed by: INTERNAL MEDICINE

## 2018-05-01 PROCEDURE — 72158 MRI LUMBAR SPINE W/O & W/DYE: CPT | Performed by: INTERNAL MEDICINE

## 2019-02-28 VITALS
WEIGHT: 222 LBS | BODY MASS INDEX: 35.68 KG/M2 | OXYGEN SATURATION: 92 % | DIASTOLIC BLOOD PRESSURE: 60 MMHG | HEART RATE: 86 BPM | SYSTOLIC BLOOD PRESSURE: 130 MMHG | HEIGHT: 66 IN

## 2019-02-28 VITALS
BODY MASS INDEX: 35.36 KG/M2 | OXYGEN SATURATION: 99 % | DIASTOLIC BLOOD PRESSURE: 60 MMHG | SYSTOLIC BLOOD PRESSURE: 120 MMHG | HEIGHT: 66 IN | HEART RATE: 76 BPM | WEIGHT: 220 LBS

## 2020-09-01 NOTE — PROGRESS NOTES
Patient arrives for daily antibiotics for epidural abcess. Patient reports he is feeling well, states he is fatigued. States he had episode of chills after getting hair cut yesterday, Patient wife states she took temperature and it was 99.   No fever or chi Resolved  -- Hold water flushes, Na trending down

## 2021-03-12 DIAGNOSIS — Z23 NEED FOR VACCINATION: ICD-10-CM

## 2021-08-19 NOTE — PLAN OF CARE
DISCHARGE PLANNING    • Discharge to home or other facility with appropriate resources Progressing        HEMATOLOGIC - ADULT    • Maintains hematologic stability Progressing    • Free from bleeding injury Progressing        Impaired Activities of Daily Katarina Selby Medications

## (undated) DEVICE — GOWN SURG AERO BLUE PERF LG

## (undated) DEVICE — CULTURE COLLECT/TRANSPORT SYS

## (undated) DEVICE — SUCTION CANISTER, 3000CC,SAFELINER: Brand: DEROYAL

## (undated) DEVICE — DRAPE C-ARM UNIVERSAL

## (undated) DEVICE — CHLORAPREP 26ML APPLICATOR

## (undated) DEVICE — CONTAINER SPEC STR 4OZ GRY LID

## (undated) DEVICE — SUTURE VICRYL 0 CT-2

## (undated) DEVICE — 3.0MM PRECISION NEURO (MATCH HEAD)

## (undated) DEVICE — DRAPE SRG 150X54IN LEICA

## (undated) DEVICE — GLOVE SRG BIOGEL 8.5

## (undated) DEVICE — LAMINECTOMY: Brand: MEDLINE INDUSTRIES, INC.

## (undated) DEVICE — CULTURE TUBE ANAEROBIC

## (undated) DEVICE — TRAY FOLEY BDX 16F STATLOCK

## (undated) DEVICE — SUTURE VICRYL 3-0 J761D

## (undated) DEVICE — 3M™ TEGADERM™ TRANSPARENT FILM DRESSING, 1626W, 4 IN X 4-3/4 IN (10 CM X 12 CM), 50 EACH/CARTON, 4 CARTON/CASE: Brand: 3M™ TEGADERM™

## (undated) DEVICE — SUTURE MONOCRYL 4-0 PS-2

## (undated) DEVICE — STERILE POLYISOPRENE POWDER-FREE SURGICAL GLOVES: Brand: PROTEXIS

## (undated) DEVICE — WRAP THRP PLRCR500 BCK DRSG

## (undated) DEVICE — SPONGE LAP 18X18 XRAY STRL

## (undated) DEVICE — FLOSEAL SEALENT STERILE 10ML

## (undated) DEVICE — COVER PSTN BW FRM SKN CR KT

## (undated) DEVICE — DERMABOND LIQUID ADHESIVE

## (undated) DEVICE — PEN: MARKING STD PT 100/CS: Brand: MEDICAL ACTION INDUSTRIES

## (undated) DEVICE — SOL  .9 1000ML BTL

## (undated) DEVICE — GAUZE SPONGES,12 PLY: Brand: CURITY

## (undated) DEVICE — KIT DRN 1/8IN PVC 3 SPRG EVAC

## (undated) DEVICE — BIPOLAR SEALER 23-112-1 AQM 6.0: Brand: AQUAMANTYS™

## (undated) DEVICE — DRESSING BIOPATCH 1X4 CNTR

## (undated) DEVICE — SOL  .9 1000ML BAG

## (undated) NOTE — IP AVS SNAPSHOT
[facility]            (For Outpatient Use Only) Initial Admit Date: 3/21/2018   Inpt/Obs Admit Date: Inpt: 3/22/18 / Obs: 03/21/18   Discharge Date:    Margareth Albright:  [de-identified]   MRN: [de-identified]   CSN: [id number]        [id number] Hospital Account Financial Class: Medicare    March 27, 2018

## (undated) NOTE — IP AVS SNAPSHOT
Patient Demographics     Address  [address] [place] [address] Phone  [phone] (Home) *Preferred*      Emergency Contact(s)     Name Relation Home Work Mobile    Carlos Spouse   [phone]      Allergies as of 3/27/2018  Reviewe Take 10 mg by mouth nightly as needed for Muscle spasms. HYDROcodone-acetaminophen 5-325 MG Tabs  Commonly known as:  NORCO  Next dose due:  As needed for pain       Take 1-2 tablets by mouth daily as needed for Pain.           HYDROcodone-acetami [id number] Heparin Sodium (Porcine) 5000 UNIT/ML injection 5,000 Units 03/27/18 0820 Given                    Recent Vital Signs    Flowsheet Row Most Recent Value   Vitals  113/61 Filed at 03/27/2018 1426   Pulse  102 Filed at 03/27/2018 1426   Resp  18 F Blood Culture Result No Growth 5 Days    FUNGUS CULTURE(P) Once [[id number]] Collected:  03/23/18 1813    Order Status:  Completed Lab Status:  Preliminary result Updated:  03/26/18 2000    Specimen:  Abscess from Lumbar disc      Fungus Culture Result No AFB Smear Once [[id number]] Collected:  03/23/18 1817    Order Status:  Completed Lab Status:  Final result Updated:  03/26/18 0195    Specimen:  Abscess from Lumbar disc      AFB Smear No Acid Fast Bacilli observed on direct smear    Anaerobic Culture [21 Specimen:  Abscess from Lumbar disc      Fungus Smear No Yeast or Fungal elements observed    AFB CULTURE(P) Once [[id number]] Collected:  03/23/18 1813    Order Status:  Sent Lab Status:   In process Updated:  03/23/18 1840    Specimen:  Abscess from Lumba SOCIAL HISTORY:  He has never smoked. No alcohol. He is ; he has children. REVIEW OF SYSTEMS:  He is losing weight. PHYSICAL EXAMINATION:    VITAL SIGNS:  Blood pressure 112/49, pulse 103, respirations 24, temperature 98. 3. HEENT:  PERRL. Location [facility]5W Attending Robert Downs, [address] [place] [address] Day # 0 PCP Alonso Ash MD     3/21/2018  Reason for Consultation:  Oralee Raw    History of Present Illness:  Nicolette Humphreys is a a(n) 69year old male known to cardio Facility-Administered Medications Ordered in Other Encounters:   •  DAPTOmycin (CUBICIN) 525 mg in Sterile Water for Injection IV syringe, 525 mg, Intravenous, Once    Review of Systems:  GENERAL HEALTH: feels well otherwise  SKIN: denies any unusual skin CA 8.6 03/22/2018   ALB 2.5 03/22/2018   ALKPHO 62 03/22/2018   BILT 0.4 03/22/2018   TP 6.9 03/22/2018   AST 29 03/22/2018   ALT 38 03/22/2018       Imaging:            Impression:  Patient Active Problem List:     Sepsis, Gram positive ([address]     Sepsis ( Evaluation Date: 3/27/2018  Type of Evaluation: Initial   Physician Order: PT Eval and Treat    Presenting Problem: bacteremia  sepsis    hx of epidural abscess requiring PMH of laminectomy last admission  2/27/18   this admission pt with 3/23 IR disc aspi provided, pt could use review and reinforcement of education. NO family present this session for training or d/c planning.[KS.2]    Pt able to amb with  ft , cues for posture and use of AD provided. Pt with IV line in place .     Pt present statu yesterday, developed shaking chills when leaving the office, blood cx obtained at [facility] office, positive for GNR. Per patient and patient's wife, patient developed chills after his weekly blood draws from PICC line. , ESR 83, CRP 6.5. ID consulted. SUBJECTIVE  Goal for rehab facility  Concern about need for IV antibiotics at d/c     Pain well controlled     PHYSICAL THERAPY EXAMINATION     OBJECTIVE  Precautions: Spine  Fall Risk: Standard fall risk    WEIGHT BEARING RESTRICTION  Weight Bearing Restr PT Approx Degree of Impairment Score: 41.77%   Standardized Score (AM-PAC Scale): 45.44   CMS Modifier (G-Code): CK    FUNCTIONAL ABILITY STATUS  Gait Assessment   Gait Assistance: Supervision  Distance (ft): 400 ft  Assistive Device: Rolling walker  Shraddha Author:  Paul Estrada PT Service:  Rehab Author Type:  Physical Therapist    Filed:  3/27/2018  9:22 AM Date of Service:  3/27/2018  8:57 AM Status:  Signed    :  Paul Estrada PT (Physical Therapist)    Related Notes:  Addendum bed mobility primarily to guide pt for proper sequencing. Pt with SBA for transfers and ambulation for pt safety and lines. Pt education for spine precautions, spine safety education ,  And fall risk prevention initiated with pt.     Pt able to amb w yesterday, developed shaking chills when leaving the office, blood cx obtained at [facility] office, positive for GNR. Per patient and patient's wife, patient developed chills after his weekly blood draws from PICC line. , ESR 83, CRP 6.5. ID consulted. 3/27/2018 11:25 AM  Version 1 of 1    Author:  Angie Arauz OT Service:  (none) Author Type:  Occupational Therapist    Filed:  3/27/2018 11:25 AM Date of Service:  3/27/2018  8:53 AM Status:  Signed    :  Angie Arauz OT (Occ mobility. The patient is below[KH.1] baseline (2/27/18 status)[KH.3] and would benefit from skilled inpatient OT to address the above deficits, maximizing patient's ability to return to prior level of function.  Following DC anticipate pt will continue to Precautions: Spine  Fall Risk: Standard fall risk[KH.2]    WEIGHT BEARING RESTRICTION                PAIN ASSESSMENT[KH.1]  Rating: Unable to rate  Location: lower back  Management Techniques: Activity promotion; Body mechanics;Repositioning[KH. 2]    ACTIVI Toileting: NT anticipate SBA  Upper Extremity Dressing: NT anticipate Min A for managing PICC line  Lower Extremity Dressing: Max A    Education Provided: role and purpose of OT, spinal precautions and education, car t/f[KH.1]  Patient End of Session: Up i

## (undated) NOTE — LETTER
[facility], [place]  Authorization for Invasive Procedures  1.  I hereby authorize Dr. Jose C Morin /Dr. Ulises Guaman* , my physician and whomever may be designated as the doctor's assistant, to perform the following operation and/or proce 5. I consent to the photographing of the operations or procedures to be performed for the purposes of advancing medicine, science, and/or education, provided my identity is not revealed.  If the procedure has been videotaped, the physician/surgeon will obta __________ Time: ___________    Statement of Physician  My signature below affirms that prior to the time of the procedure, I have explained to the patient and/or his legal representative, the risks and benefits involved in the proposed treatment and any r

## (undated) NOTE — LETTER
[address] [place] [address]  Authorization for Invasive Procedures  1.  I hereby authorize Dr. Katelyn Webb , my physician and whomever may be designated as the doctor's assistant, to perform the following operation and/or procedure:  X-ray performed for the purposes of advancing medicine, science, and/or education, provided my identity is not revealed. If the procedure has been videotaped, the physician/surgeon will obtain the original videotape.  The hospital will not be responsible for stor My signature below affirms that prior to the time of the procedure, I have explained to the patient and/or his legal representative, the risks and benefits involved in the proposed treatment and any reasonable alternative to the proposed treatment.  I have

## (undated) NOTE — LETTER
[facility], [place]  Authorization for Invasive Procedures  1.  I hereby authorize Dr. Franco Reinoso , my physician and whomever may be designated as the doctor's assistant, to perform the following operation and/or procedure:  Aspiration performed for the purposes of advancing medicine, science, and/or education, provided my identity is not revealed. If the procedure has been videotaped, the physician/surgeon will obtain the original videotape.  The hospital will not be responsible for stor My signature below affirms that prior to the time of the procedure, I have explained to the patient and/or his legal representative, the risks and benefits involved in the proposed treatment and any reasonable alternative to the proposed treatment.  I have